# Patient Record
Sex: FEMALE | Race: WHITE | NOT HISPANIC OR LATINO | URBAN - METROPOLITAN AREA
[De-identification: names, ages, dates, MRNs, and addresses within clinical notes are randomized per-mention and may not be internally consistent; named-entity substitution may affect disease eponyms.]

---

## 2017-08-30 ENCOUNTER — OUTPATIENT (OUTPATIENT)
Dept: OUTPATIENT SERVICES | Facility: HOSPITAL | Age: 82
LOS: 1 days | Discharge: HOME | End: 2017-08-30

## 2017-08-30 DIAGNOSIS — M48.06 SPINAL STENOSIS, LUMBAR REGION: ICD-10-CM

## 2017-08-30 DIAGNOSIS — M48.061 SPINAL STENOSIS, LUMBAR REGION WITHOUT NEUROGENIC CLAUDICATION: ICD-10-CM

## 2017-08-30 DIAGNOSIS — Z01.818 ENCOUNTER FOR OTHER PREPROCEDURAL EXAMINATION: ICD-10-CM

## 2017-08-30 DIAGNOSIS — I10 ESSENTIAL (PRIMARY) HYPERTENSION: ICD-10-CM

## 2017-09-13 ENCOUNTER — INPATIENT (INPATIENT)
Facility: HOSPITAL | Age: 82
LOS: 0 days | Discharge: HOME | End: 2017-09-14
Attending: NEUROLOGICAL SURGERY

## 2017-09-13 DIAGNOSIS — I10 ESSENTIAL (PRIMARY) HYPERTENSION: ICD-10-CM

## 2017-09-13 DIAGNOSIS — Z87.891 PERSONAL HISTORY OF NICOTINE DEPENDENCE: ICD-10-CM

## 2017-09-13 DIAGNOSIS — M48.061 SPINAL STENOSIS, LUMBAR REGION WITHOUT NEUROGENIC CLAUDICATION: ICD-10-CM

## 2017-09-13 DIAGNOSIS — M19.90 UNSPECIFIED OSTEOARTHRITIS, UNSPECIFIED SITE: ICD-10-CM

## 2017-09-13 DIAGNOSIS — M48.06 SPINAL STENOSIS, LUMBAR REGION: ICD-10-CM

## 2017-09-13 DIAGNOSIS — M51.26 OTHER INTERVERTEBRAL DISC DISPLACEMENT, LUMBAR REGION: ICD-10-CM

## 2017-09-13 DIAGNOSIS — K21.9 GASTRO-ESOPHAGEAL REFLUX DISEASE WITHOUT ESOPHAGITIS: ICD-10-CM

## 2017-09-13 DIAGNOSIS — E66.9 OBESITY, UNSPECIFIED: ICD-10-CM

## 2018-01-25 ENCOUNTER — OUTPATIENT (OUTPATIENT)
Dept: OUTPATIENT SERVICES | Facility: HOSPITAL | Age: 83
LOS: 1 days | Discharge: HOME | End: 2018-01-25

## 2018-01-25 DIAGNOSIS — Z01.818 ENCOUNTER FOR OTHER PREPROCEDURAL EXAMINATION: ICD-10-CM

## 2018-01-25 DIAGNOSIS — M51.26 OTHER INTERVERTEBRAL DISC DISPLACEMENT, LUMBAR REGION: ICD-10-CM

## 2018-02-02 DIAGNOSIS — I10 ESSENTIAL (PRIMARY) HYPERTENSION: ICD-10-CM

## 2018-02-02 DIAGNOSIS — E66.9 OBESITY, UNSPECIFIED: ICD-10-CM

## 2018-02-04 DIAGNOSIS — M48.061 SPINAL STENOSIS, LUMBAR REGION WITHOUT NEUROGENIC CLAUDICATION: ICD-10-CM

## 2018-02-08 ENCOUNTER — INPATIENT (INPATIENT)
Facility: HOSPITAL | Age: 83
LOS: 1 days | Discharge: HOME | End: 2018-02-10
Attending: NEUROLOGICAL SURGERY

## 2018-02-08 VITALS
TEMPERATURE: 98 F | DIASTOLIC BLOOD PRESSURE: 91 MMHG | WEIGHT: 205.03 LBS | SYSTOLIC BLOOD PRESSURE: 137 MMHG | HEART RATE: 86 BPM | HEIGHT: 61 IN | RESPIRATION RATE: 18 BRPM

## 2018-02-08 RX ORDER — ASPIRIN/CALCIUM CARB/MAGNESIUM 324 MG
1 TABLET ORAL
Qty: 0 | Refills: 0 | COMMUNITY

## 2018-02-08 RX ORDER — ACETAMINOPHEN 500 MG
0 TABLET ORAL
Qty: 0 | Refills: 0 | COMMUNITY

## 2018-02-08 RX ORDER — OXYCODONE AND ACETAMINOPHEN 5; 325 MG/1; MG/1
1 TABLET ORAL EVERY 4 HOURS
Qty: 0 | Refills: 0 | Status: DISCONTINUED | OUTPATIENT
Start: 2018-02-08 | End: 2018-02-08

## 2018-02-08 RX ORDER — MEPERIDINE HYDROCHLORIDE 50 MG/ML
12.5 INJECTION INTRAMUSCULAR; INTRAVENOUS; SUBCUTANEOUS ONCE
Qty: 0 | Refills: 0 | Status: DISCONTINUED | OUTPATIENT
Start: 2018-02-08 | End: 2018-02-08

## 2018-02-08 RX ORDER — MORPHINE SULFATE 50 MG/1
4 CAPSULE, EXTENDED RELEASE ORAL
Qty: 0 | Refills: 0 | Status: DISCONTINUED | OUTPATIENT
Start: 2018-02-08 | End: 2018-02-08

## 2018-02-08 RX ORDER — ESOMEPRAZOLE MAGNESIUM 40 MG/1
1 CAPSULE, DELAYED RELEASE ORAL
Qty: 0 | Refills: 0 | COMMUNITY

## 2018-02-08 RX ORDER — OXYCODONE AND ACETAMINOPHEN 5; 325 MG/1; MG/1
2 TABLET ORAL ONCE
Qty: 0 | Refills: 0 | Status: DISCONTINUED | OUTPATIENT
Start: 2018-02-09 | End: 2018-02-10

## 2018-02-08 RX ORDER — SODIUM CHLORIDE 9 MG/ML
1000 INJECTION, SOLUTION INTRAVENOUS
Qty: 0 | Refills: 0 | Status: DISCONTINUED | OUTPATIENT
Start: 2018-02-08 | End: 2018-02-08

## 2018-02-08 RX ORDER — METHOCARBAMOL 500 MG/1
750 TABLET, FILM COATED ORAL ONCE
Qty: 0 | Refills: 0 | Status: COMPLETED | OUTPATIENT
Start: 2018-02-08 | End: 2018-02-09

## 2018-02-08 RX ORDER — MORPHINE SULFATE 50 MG/1
4 CAPSULE, EXTENDED RELEASE ORAL ONCE
Qty: 0 | Refills: 0 | Status: DISCONTINUED | OUTPATIENT
Start: 2018-02-09 | End: 2018-02-10

## 2018-02-08 RX ORDER — AMLODIPINE BESYLATE 2.5 MG/1
10 TABLET ORAL EVERY 24 HOURS
Qty: 0 | Refills: 0 | Status: DISCONTINUED | OUTPATIENT
Start: 2018-02-08 | End: 2018-02-10

## 2018-02-08 RX ORDER — OXYCODONE AND ACETAMINOPHEN 5; 325 MG/1; MG/1
2 TABLET ORAL ONCE
Qty: 0 | Refills: 0 | Status: DISCONTINUED | OUTPATIENT
Start: 2018-02-08 | End: 2018-02-08

## 2018-02-08 RX ORDER — ONDANSETRON 8 MG/1
4 TABLET, FILM COATED ORAL ONCE
Qty: 0 | Refills: 0 | Status: DISCONTINUED | OUTPATIENT
Start: 2018-02-09 | End: 2018-02-10

## 2018-02-08 RX ORDER — MORPHINE SULFATE 50 MG/1
2 CAPSULE, EXTENDED RELEASE ORAL
Qty: 0 | Refills: 0 | Status: DISCONTINUED | OUTPATIENT
Start: 2018-02-08 | End: 2018-02-08

## 2018-02-08 RX ORDER — METHOCARBAMOL 500 MG/1
750 TABLET, FILM COATED ORAL EVERY 8 HOURS
Qty: 0 | Refills: 0 | Status: DISCONTINUED | OUTPATIENT
Start: 2018-02-08 | End: 2018-02-08

## 2018-02-08 RX ORDER — ONDANSETRON 8 MG/1
4 TABLET, FILM COATED ORAL EVERY 6 HOURS
Qty: 0 | Refills: 0 | Status: DISCONTINUED | OUTPATIENT
Start: 2018-02-08 | End: 2018-02-08

## 2018-02-08 RX ORDER — SODIUM CHLORIDE 9 MG/ML
1000 INJECTION INTRAMUSCULAR; INTRAVENOUS; SUBCUTANEOUS
Qty: 0 | Refills: 0 | Status: DISCONTINUED | OUTPATIENT
Start: 2018-02-08 | End: 2018-02-09

## 2018-02-08 RX ORDER — DOCUSATE SODIUM 100 MG
100 CAPSULE ORAL ONCE
Qty: 0 | Refills: 0 | Status: COMPLETED | OUTPATIENT
Start: 2018-02-08 | End: 2018-02-09

## 2018-02-08 RX ORDER — FUROSEMIDE 40 MG
40 TABLET ORAL ONCE
Qty: 0 | Refills: 0 | Status: COMPLETED | OUTPATIENT
Start: 2018-02-08 | End: 2018-02-09

## 2018-02-08 RX ORDER — ONDANSETRON 8 MG/1
4 TABLET, FILM COATED ORAL ONCE
Qty: 0 | Refills: 0 | Status: DISCONTINUED | OUTPATIENT
Start: 2018-02-08 | End: 2018-02-08

## 2018-02-08 RX ADMIN — SODIUM CHLORIDE 75 MILLILITER(S): 9 INJECTION, SOLUTION INTRAVENOUS at 19:50

## 2018-02-08 NOTE — BRIEF OPERATIVE NOTE - POST-OP DX
Degenerative disc disease, lumbar  02/08/2018  L3-4  Active  Sunny Godoy  Lumbar herniated disc  02/08/2018  L3-4 recurrent  Active  Sunny Godoy

## 2018-02-08 NOTE — BRIEF OPERATIVE NOTE - PROCEDURE
<<-----Click on this checkbox to enter Procedure Laminectomy of lumbar spine with insertion of implant  02/08/2018  Coflex  Active  JSHIAU  Open discectomy of lumbar spine  02/08/2018  Reexploration for recurrent disc herniation with microscopic dissection  Active  JSHIAU

## 2018-02-08 NOTE — BRIEF OPERATIVE NOTE - PRE-OP DX
Lumbar degenerative disc disease  02/08/2018  L3-4  Active  Sunny Godoy  Lumbar disc herniation with radiculopathy  02/08/2018  Recurrent L3-4 herniated disc  Active  Sunny Godoy

## 2018-02-08 NOTE — PRE-ANESTHESIA EVALUATION ADULT - NSANTHPMHFT_GEN_ALL_CORE
hypertension, laminectomy in the past hypertension, Hiatal hernia, Osteoarthritis ,cholecystectomy, laminectomy in the past

## 2018-02-09 RX ORDER — OXYCODONE AND ACETAMINOPHEN 5; 325 MG/1; MG/1
1 TABLET ORAL EVERY 4 HOURS
Qty: 0 | Refills: 0 | Status: DISCONTINUED | OUTPATIENT
Start: 2018-02-09 | End: 2018-02-10

## 2018-02-09 RX ORDER — OXYCODONE AND ACETAMINOPHEN 5; 325 MG/1; MG/1
2 TABLET ORAL EVERY 4 HOURS
Qty: 0 | Refills: 0 | Status: DISCONTINUED | OUTPATIENT
Start: 2018-02-09 | End: 2018-02-10

## 2018-02-09 RX ORDER — ACETAMINOPHEN 500 MG
325 TABLET ORAL EVERY 4 HOURS
Qty: 0 | Refills: 0 | Status: DISCONTINUED | OUTPATIENT
Start: 2018-02-09 | End: 2018-02-10

## 2018-02-09 RX ORDER — DOCUSATE SODIUM 100 MG
100 CAPSULE ORAL THREE TIMES A DAY
Qty: 0 | Refills: 0 | Status: DISCONTINUED | OUTPATIENT
Start: 2018-02-09 | End: 2018-02-10

## 2018-02-09 RX ORDER — FUROSEMIDE 40 MG
40 TABLET ORAL DAILY
Qty: 0 | Refills: 0 | Status: DISCONTINUED | OUTPATIENT
Start: 2018-02-09 | End: 2018-02-10

## 2018-02-09 RX ORDER — METHOCARBAMOL 500 MG/1
750 TABLET, FILM COATED ORAL EVERY 6 HOURS
Qty: 0 | Refills: 0 | Status: DISCONTINUED | OUTPATIENT
Start: 2018-02-09 | End: 2018-02-10

## 2018-02-09 RX ORDER — HEPARIN SODIUM 5000 [USP'U]/ML
5000 INJECTION INTRAVENOUS; SUBCUTANEOUS EVERY 8 HOURS
Qty: 0 | Refills: 0 | Status: DISCONTINUED | OUTPATIENT
Start: 2018-02-09 | End: 2018-02-10

## 2018-02-09 RX ADMIN — AMLODIPINE BESYLATE 10 MILLIGRAM(S): 2.5 TABLET ORAL at 05:48

## 2018-02-09 RX ADMIN — Medication 40 MILLIGRAM(S): at 05:48

## 2018-02-09 RX ADMIN — Medication 100 MILLIGRAM(S): at 05:47

## 2018-02-09 RX ADMIN — HEPARIN SODIUM 5000 UNIT(S): 5000 INJECTION INTRAVENOUS; SUBCUTANEOUS at 15:09

## 2018-02-09 RX ADMIN — METHOCARBAMOL 750 MILLIGRAM(S): 500 TABLET, FILM COATED ORAL at 23:12

## 2018-02-09 RX ADMIN — Medication 40 MILLIGRAM(S): at 17:16

## 2018-02-09 RX ADMIN — OXYCODONE AND ACETAMINOPHEN 2 TABLET(S): 5; 325 TABLET ORAL at 20:44

## 2018-02-09 RX ADMIN — HEPARIN SODIUM 5000 UNIT(S): 5000 INJECTION INTRAVENOUS; SUBCUTANEOUS at 20:50

## 2018-02-09 RX ADMIN — OXYCODONE AND ACETAMINOPHEN 2 TABLET(S): 5; 325 TABLET ORAL at 10:56

## 2018-02-09 RX ADMIN — OXYCODONE AND ACETAMINOPHEN 2 TABLET(S): 5; 325 TABLET ORAL at 21:00

## 2018-02-09 RX ADMIN — Medication 100 MILLIGRAM(S): at 20:49

## 2018-02-09 RX ADMIN — Medication 100 MILLIGRAM(S): at 15:52

## 2018-02-09 RX ADMIN — Medication 100 MILLIGRAM(S): at 20:48

## 2018-02-09 RX ADMIN — Medication 325 MILLIGRAM(S): at 05:47

## 2018-02-09 RX ADMIN — Medication 100 MILLIGRAM(S): at 15:08

## 2018-02-09 RX ADMIN — Medication 100 MILLIGRAM(S): at 05:48

## 2018-02-09 RX ADMIN — METHOCARBAMOL 750 MILLIGRAM(S): 500 TABLET, FILM COATED ORAL at 17:14

## 2018-02-09 RX ADMIN — METHOCARBAMOL 750 MILLIGRAM(S): 500 TABLET, FILM COATED ORAL at 05:48

## 2018-02-09 NOTE — CONSULT NOTE ADULT - SUBJECTIVE AND OBJECTIVE BOX
HPI: 86 yo F admitted on2/8/18 for back pain radiating to right leg due to herniated disc at L3-L4. S/P reexploration and discectomy at L3-L4 and laminectomy.  Prior to hospitalization she was ambulating with walker.      PAST MEDICAL & SURGICAL HISTORY:  OBESITY OA HTN       Hospital Course:    TODAY'S SUBJECTIVE & REVIEW OF SYMPTOMS:     Constitutional WNL   Cardio WNL   Resp WNL   GI WNL  Heme WNL  Endo WNL  Skin WNL  MSK WNL  Neuro WNL  Cognitive WNL  Psych WNL      MEDICATIONS  (STANDING):  amLODIPine   Tablet 10 milliGRAM(s) Oral every 24 hours  clindamycin IVPB 600 milliGRAM(s) IV Intermittent every 8 hours  docusate sodium 100 milliGRAM(s) Oral three times a day  furosemide    Tablet 40 milliGRAM(s) Oral daily  heparin  Injectable 5000 Unit(s) SubCutaneous every 8 hours  methocarbamol 750 milliGRAM(s) Oral every 6 hours    MEDICATIONS  (PRN):  acetaminophen   Tablet 325 milliGRAM(s) Oral every 4 hours PRN pain  morphine  - Injectable 4 milliGRAM(s) IV Push Once PRN severe breakthrough pain  ondansetron Injectable 4 milliGRAM(s) IV Push Once PRN Nausea and/or Vomiting  oxyCODONE    5 mG/acetaminophen 325 mG 2 Tablet(s) Oral Once PRN Severe Pain (7 - 10)  oxyCODONE    5 mG/acetaminophen 325 mG 1 Tablet(s) Oral every 4 hours PRN Mild Pain (1 - 3)  oxyCODONE    5 mG/acetaminophen 325 mG 2 Tablet(s) Oral every 4 hours PRN Moderate Pain (4 - 6)      FAMILY HISTORY:NC      Allergies    penicillins (Unknown)  shellfish (Hives)    Intolerances        SOCIAL HISTORY:    [  ] Etoh  [  ] Smoking  [  ] Substance abuse     Home Environment:  [x  ] Home Alone  [  ] Lives with Family  [  ] Home Health Aid    Dwelling:  [  ] Apartment  [ x ] Private House  [  ] Adult Home  [  ] Skilled Nursing Facility      [  ] Short Term  [  ] Long Term  [ x ] Stairs       Elevator [  ]    FUNCTIONAL STATUS PTA: (Check all that apply)  Ambulation: [  x ]Independent    [  ] Dependent     [  ] Non-Ambulatory  Assistive Device: [  ] SA Cane  [  ]  Q Cane  [ x ] Walker  [  ]  Wheelchair  ADL : [x  ] Independent  [  ]  Dependent       Vital Signs Last 24 Hrs  T(C): 37 (09 Feb 2018 11:02), Max: 37.1 (08 Feb 2018 21:32)  T(F): 98.6 (09 Feb 2018 11:02), Max: 98.7 (08 Feb 2018 21:32)  HR: 96 (09 Feb 2018 11:02) (86 - 888)  BP: 152/67 (09 Feb 2018 11:02) (121/58 - 162/82)  BP(mean): --  RR: 18 (09 Feb 2018 11:02) (11 - 18)  SpO2: 97% (08 Feb 2018 22:00) (94% - 97%)      PHYSICAL EXAM: Alert & Oriented X3  GENERAL: NAD, well-groomed, well-developed  HEAD:  Atraumatic, Normocephalic  EYES: EOMI, PERRLA, conjunctiva and sclera clear  NECK: Supple, No JVD, Normal thyroid  CHEST/LUNG: Clear to percussion bilaterally; No rales, rhonchi, wheezing, or rubs  HEART: Regular rate and rhythm; No murmurs, rubs, or gallops  ABDOMEN: Soft, Nontender, Nondistended; Bowel sounds present  EXTREMITIES:  2+ Peripheral Pulses, No clubbing, cyanosis, or edema    NERVOUS SYSTEM:  Cranial Nerves 2-12 intact [x  ] Abnormal  [  ]  ROM: WFL all extremities [x  ]  Abnormal [  ]  Motor Strength: WFL all extremities  [x  ]  Abnormal [  ]  Sensation: intact to light touch [x  ] Abnormal [  ]  Reflexes: Symmetric [  ]  Abnormal [  ]    FUNCTIONAL STATUS:  Bed Mobility: Independent [  ]  Supervision [  ]  Needs Assistance [x  ]  N/A [  ]  Transfers: Independent [  ]  Supervision [  ]  Needs Assistance [x  ]  N/A [  ]   Ambulation: Independent [  ]  Supervision [  ]  Needs Assistance [  ]  N/A [  ]  ADL: Independent [  ] Requires Assistance [  ] N/A [  ]      LABS:                RADIOLOGY & ADDITIONAL STUDIES:    Assesment:

## 2018-02-09 NOTE — PROGRESS NOTE ADULT - SUBJECTIVE AND OBJECTIVE BOX
STATUS POST:  Re-exploration L 3-4 Laminectomy/Diskectomy with Coflex Stabilization    POST OPERATIVE DAY #: 0    SUBJECTIVE: Pt seen  SOB:  [ ] YES [x ] NO  Chest Discomfort: [ ] YES [x ] NO    Nausea: [ ] YES [ x] NO           Vomiting: [ ] YES [ x] NO  Flatus: [ ] YES [ x] NO             Bowel Movement: [ ] YES [x ] NO  Diarrhea: [ ] YES [x ] NO         Void: [x ]YES [ ]No  Constipation: [ ] YES [x ] NO     Pain (0-10):   4/10           Pain Control Adequate: [ x] YES [ ] NO  Watson:  NGT:    Vital Signs Last 24 Hrs  T(C): 36.5 (08 Feb 2018 23:39), Max: 37.1 (08 Feb 2018 21:32)  T(F): 97.7 (08 Feb 2018 23:39), Max: 98.7 (08 Feb 2018 21:32)  HR: 97 (08 Feb 2018 23:39) (86 - 888)  BP: 136/66 (08 Feb 2018 23:39) (124/86 - 162/82)  BP(mean): --  RR: 18 (08 Feb 2018 23:39) (11 - 18)  SpO2: 96% (08 Feb 2018 21:32) (94% - 97%)  I&O's Summary    08 Feb 2018 07:01  -  09 Feb 2018 00:05  --------------------------------------------------------  IN: 112 mL / OUT: 0 mL / NET: 112 mL      I&O's Detail    08 Feb 2018 07:01  -  09 Feb 2018 00:05  --------------------------------------------------------  IN:    lactated ringers.: 112 mL  Total IN: 112 mL    OUT:  Total OUT: 0 mL    Total NET: 112 mL          MEDICATIONS  (STANDING):  amLODIPine   Tablet 10 milliGRAM(s) Oral every 24 hours  clindamycin IVPB 600 milliGRAM(s) IV Intermittent Once  docusate sodium 100 milliGRAM(s) Oral Once  furosemide    Tablet 40 milliGRAM(s) Oral Once  methocarbamol 750 milliGRAM(s) Oral Once  sodium chloride 0.9%. 1000 milliLiter(s) (75 mL/Hr) IV Continuous <Continuous>    MEDICATIONS  (PRN):      LABS:                RADIOLOGY & ADDITIONAL STUDIES:    PHYSICAL EXAM:      Constitutional: Pt is in no distress, a&ox3, tolerating diet    Eyes:Perrrla    ENMT: wnl    Neck:wnl      Breasts:wnl    Back: dressing dry intact    Respiratory:b/l breath sounds    Cardiovascular: rate reg    Gastrointestinal: soft, non distended    Genitourinary:    Rectal:    Extremities: motor and sensory grossly intact in all fours    Vascular:    Neurological: CN ii-xii intact    Skin:    Lymph Nodes:    Musculoskeletal:    Psychiatric:        A/P: 85y Female M51.26 / 92793, 76504  ^M51.26 / 33900, 26050  MEWS Score  Degenerative disc disease, lumbar  Lumbar herniated disc  Lumbar degenerative disc disease  Lumbar disc herniation with radiculopathy  Open discectomy of lumbar spine  Laminectomy of lumbar spine with insertion of implant   s/p Re-exploration L3-4 Lami/diskectomy  - Diet: regular  - Activity: OOB in am  - Labs:   - Pain medication   - DVT ppx

## 2018-02-09 NOTE — CONSULT NOTE ADULT - ASSESSMENT
IMPRESSION: Rehab of lumbar radiculopathy    PRECAUTIONS: [  ] Cardiac  [  ] Respiratory  [  ] Seizures [  ] Contact Isolation  [  ] Droplet Isolation  [  ] Other    Weight Bearing Status:     RECOMMENDATION:    Out of Bed to Chair     DVT/Decubiti Prophylaxis    REHAB PLAN:     [ x  ] Bedside P/T 3-5 times a week   [   ]   Bedside O/T  2-3 times a week             [   ] No Rehab Therapy Indicated                   [   ]  Speech Therapy   Conditioning/ROM                                    ADL  Bed Mobility                                               Conditioning/ROM  Transfers                                                     Bed Mobility  Sitting /Standing Balance                         Transfers                                        Gait Training                                               Sitting/Standing Balance  Stair Training [   ]Applicable                    Home equipment Eval                                                                        Splinting  [   ] Only      GOALS:   ADL   [ x  ]   Independent                    Transfers  [x   ] Independent                          Ambulation  [ x  ] Independent     [ x   ] With device                            [   ]  CG                                                         [   ]  CG                                                                  [   ] CG                            [    ] Min A                                                   [   ] Min A                                                              [   ] Min  A          DISCHARGE PLAN:   [   ]  Good candidate for Intensive Rehabilitation/Hospital based-4A [facility]                                             Will tolerate 3hrs Intensive Rehab Daily                                       [    ]  Short Term Rehab in Skilled Nursing Facility                                       [  x  ]  Home with Outpatient or  services                                         [    ]  Possible Candidate for Intensive Hospital based Rehab

## 2018-02-09 NOTE — PROGRESS NOTE ADULT - SUBJECTIVE AND OBJECTIVE BOX
POD # 1    S/P Re-exploration of L3-4 laminectomy/diskectomy with coflex stabilization     Pt seen and examined in her room patient sitting at bedside patient not complaining of any pain.   patient has walked to the bathroom with mild assistance already.      Vital Signs Last 24 Hrs  T(C): 37 (09 Feb 2018 11:02), Max: 37.1 (08 Feb 2018 21:32)  T(F): 98.6 (09 Feb 2018 11:02), Max: 98.7 (08 Feb 2018 21:32)  HR: 96 (09 Feb 2018 11:02) (86 - 888)  BP: 152/67 (09 Feb 2018 11:02) (121/58 - 162/82)  BP(mean): --  RR: 18 (09 Feb 2018 11:02) (11 - 18)  SpO2: 97% (08 Feb 2018 22:00) (94% - 97%)    PHYSICAL EXAM:  A&OX3 PERRa   MAEX4   strength equal bilaterally  sensory intact   back incision site clean dry and intact           MEDICATIONS:  acetaminophen   Tablet 325 milliGRAM(s) Oral every 4 hours PRN  morphine  - Injectable 4 milliGRAM(s) IV Push Once PRN  ondansetron Injectable 4 milliGRAM(s) IV Push Once PRN  oxyCODONE    5 mG/acetaminophen 325 mG 2 Tablet(s) Oral Once PRN  oxyCODONE    5 mG/acetaminophen 325 mG 1 Tablet(s) Oral every 4 hours PRN  oxyCODONE    5 mG/acetaminophen 325 mG 2 Tablet(s) Oral every 4 hours PRN    Anticoagulation:        pt has on SCD's        will start DVT prophylaxis today [facility]    OTHER:  amLODIPine   Tablet 10 milliGRAM(s) Oral every 24 hours    IVF:  sodium chloride 0.9%. 1000 milliLiter(s) IV Continuous <Continuous>    will d/c    A/P     s/p Re- exploration of L3-4 laminectomy / diskectomy with coflex stabilization           PT/ rehab           Start DVT prophylaxis today           DC IVF

## 2018-02-10 ENCOUNTER — TRANSCRIPTION ENCOUNTER (OUTPATIENT)
Age: 83
End: 2018-02-10

## 2018-02-10 VITALS
HEART RATE: 75 BPM | SYSTOLIC BLOOD PRESSURE: 127 MMHG | DIASTOLIC BLOOD PRESSURE: 58 MMHG | TEMPERATURE: 98 F | RESPIRATION RATE: 18 BRPM

## 2018-02-10 RX ORDER — AMLODIPINE BESYLATE 2.5 MG/1
1 TABLET ORAL
Qty: 0 | Refills: 0 | COMMUNITY

## 2018-02-10 RX ORDER — ACETAMINOPHEN 500 MG
1 TABLET ORAL
Qty: 0 | Refills: 0 | DISCHARGE
Start: 2018-02-10

## 2018-02-10 RX ORDER — DOCUSATE SODIUM 100 MG
1 CAPSULE ORAL
Qty: 0 | Refills: 0 | DISCHARGE
Start: 2018-02-10

## 2018-02-10 RX ORDER — AMLODIPINE BESYLATE 2.5 MG/1
1 TABLET ORAL
Qty: 0 | Refills: 0 | DISCHARGE
Start: 2018-02-10

## 2018-02-10 RX ORDER — METHOCARBAMOL 500 MG/1
1 TABLET, FILM COATED ORAL
Qty: 90 | Refills: 0
Start: 2018-02-10

## 2018-02-10 RX ADMIN — METHOCARBAMOL 750 MILLIGRAM(S): 500 TABLET, FILM COATED ORAL at 17:37

## 2018-02-10 RX ADMIN — METHOCARBAMOL 750 MILLIGRAM(S): 500 TABLET, FILM COATED ORAL at 05:52

## 2018-02-10 RX ADMIN — AMLODIPINE BESYLATE 10 MILLIGRAM(S): 2.5 TABLET ORAL at 05:52

## 2018-02-10 RX ADMIN — Medication 100 MILLIGRAM(S): at 05:52

## 2018-02-10 RX ADMIN — METHOCARBAMOL 750 MILLIGRAM(S): 500 TABLET, FILM COATED ORAL at 12:31

## 2018-02-10 RX ADMIN — HEPARIN SODIUM 5000 UNIT(S): 5000 INJECTION INTRAVENOUS; SUBCUTANEOUS at 05:50

## 2018-02-10 RX ADMIN — OXYCODONE AND ACETAMINOPHEN 2 TABLET(S): 5; 325 TABLET ORAL at 06:01

## 2018-02-10 RX ADMIN — Medication 100 MILLIGRAM(S): at 05:53

## 2018-02-10 RX ADMIN — HEPARIN SODIUM 5000 UNIT(S): 5000 INJECTION INTRAVENOUS; SUBCUTANEOUS at 13:42

## 2018-02-10 RX ADMIN — Medication 100 MILLIGRAM(S): at 13:42

## 2018-02-10 RX ADMIN — OXYCODONE AND ACETAMINOPHEN 2 TABLET(S): 5; 325 TABLET ORAL at 06:47

## 2018-02-10 RX ADMIN — Medication 40 MILLIGRAM(S): at 05:52

## 2018-02-10 NOTE — DISCHARGE NOTE ADULT - NS AS ACTIVITY OBS
No Heavy lifting/straining/Showering allowed/Do not drive or operate machinery/Stairs allowed/Walking-Indoors allowed

## 2018-02-10 NOTE — DISCHARGE NOTE ADULT - CARE PROVIDER_API CALL
Sunny Godoy), Neurological Surgery  [address] [place] [address]  Phone: [phone]  Fax: [phone]

## 2018-02-10 NOTE — DISCHARGE NOTE ADULT - REASON FOR ADMISSION
s/p re-exploration of R lumbar discs 3,4, coflex with laminectomy s/p re-exploration of R lumbar discs 3,4, coflex with  laminectomy

## 2018-02-10 NOTE — DISCHARGE NOTE ADULT - ADDITIONAL INSTRUCTIONS
you may shower, no bending lifting or driving, remove dressing in 2 days, leave open to air, follow up in 2 weeks, call for appt [phone]

## 2018-02-10 NOTE — DISCHARGE NOTE ADULT - MEDICATION SUMMARY - MEDICATIONS TO TAKE
I will START or STAY ON the medications listed below when I get home from the hospital:    acetaminophen 325 mg oral tablet  -- 1 tab(s) by mouth every 4 hours, As needed, pain  -- Indication: For pain    amLODIPine 10 mg oral tablet  -- 1 tab(s) by mouth every 24 hours  -- Indication: For htn    furosemide 40 mg oral tablet  -- 1 tab(s) by mouth once a day  -- Indication: For htn    docusate sodium 100 mg oral capsule  -- 1 cap(s) by mouth 3 times a day  -- Indication: For constapation    methocarbamol 750 mg oral tablet  -- 1 tab(s) by mouth every 6 hours -for muscle spasm MDD:4   -- Indication: For Muscle relaxant

## 2018-02-10 NOTE — DISCHARGE NOTE ADULT - PATIENT PORTAL LINK FT
You can access the [facility] Portal, offered by [facility], by registering with the following website: [email or web address]

## 2018-02-10 NOTE — DISCHARGE NOTE ADULT - CARE PLAN
Principal Discharge DX:	Disc displacement, lumbar  Goal:	to ambulate  Assessment and plan of treatment:	stable, ambulate

## 2018-02-10 NOTE — DISCHARGE NOTE ADULT - HOSPITAL COURSE
s/p re-exploration L3-4, laminectomy and discectomy and stabilization on 2/8/18, patient did well and was cleared for dicharge by Dr Godoy, f/u in office

## 2018-02-10 NOTE — PROGRESS NOTE ADULT - SUBJECTIVE AND OBJECTIVE BOX
POD # 2    S/P Re-exploration of L3-4 laminectomy/diskectomy with coflex stabilization     Pt seen and examined in her room patient sitting at bedside patient not complaining of any pain.   patient has walked to the bathroom with mild assistance already.  No leg pain    PHYSICAL EXAM:  A&OX3   MAEX4   strength equal bilaterally  sensory intact   back incision site clean dry and intact     A/P     s/p Re- exploration of L3-4 laminectomy / diskectomy with coflex stabilization         Doing great.  D/C home today.  F/u 10-14 days

## 2018-02-12 DIAGNOSIS — M19.90 UNSPECIFIED OSTEOARTHRITIS, UNSPECIFIED SITE: ICD-10-CM

## 2018-02-12 DIAGNOSIS — M48.061 SPINAL STENOSIS, LUMBAR REGION WITHOUT NEUROGENIC CLAUDICATION: ICD-10-CM

## 2018-02-12 DIAGNOSIS — M51.16 INTERVERTEBRAL DISC DISORDERS WITH RADICULOPATHY, LUMBAR REGION: ICD-10-CM

## 2018-02-12 DIAGNOSIS — I10 ESSENTIAL (PRIMARY) HYPERTENSION: ICD-10-CM

## 2018-02-12 DIAGNOSIS — M51.26 OTHER INTERVERTEBRAL DISC DISPLACEMENT, LUMBAR REGION: ICD-10-CM

## 2018-02-12 LAB — SURGICAL PATHOLOGY STUDY: SIGNIFICANT CHANGE UP

## 2022-09-27 ENCOUNTER — INPATIENT (INPATIENT)
Facility: HOSPITAL | Age: 87
LOS: 1 days | Discharge: HOME | End: 2022-09-29
Attending: INTERNAL MEDICINE | Admitting: INTERNAL MEDICINE

## 2022-09-27 VITALS
HEIGHT: 72 IN | OXYGEN SATURATION: 97 % | RESPIRATION RATE: 20 BRPM | HEART RATE: 86 BPM | TEMPERATURE: 101 F | WEIGHT: 184.97 LBS | SYSTOLIC BLOOD PRESSURE: 141 MMHG | DIASTOLIC BLOOD PRESSURE: 71 MMHG

## 2022-09-27 DIAGNOSIS — Z90.49 ACQUIRED ABSENCE OF OTHER SPECIFIED PARTS OF DIGESTIVE TRACT: Chronic | ICD-10-CM

## 2022-09-27 DIAGNOSIS — N39.0 URINARY TRACT INFECTION, SITE NOT SPECIFIED: ICD-10-CM

## 2022-09-27 DIAGNOSIS — Z98.891 HISTORY OF UTERINE SCAR FROM PREVIOUS SURGERY: Chronic | ICD-10-CM

## 2022-09-27 DIAGNOSIS — I10 ESSENTIAL (PRIMARY) HYPERTENSION: ICD-10-CM

## 2022-09-27 DIAGNOSIS — Z98.890 OTHER SPECIFIED POSTPROCEDURAL STATES: Chronic | ICD-10-CM

## 2022-09-27 LAB
ALBUMIN SERPL ELPH-MCNC: 3.9 G/DL — SIGNIFICANT CHANGE UP (ref 3.5–5.2)
ALP SERPL-CCNC: 80 U/L — SIGNIFICANT CHANGE UP (ref 30–115)
ALT FLD-CCNC: 8 U/L — SIGNIFICANT CHANGE UP (ref 0–41)
ANION GAP SERPL CALC-SCNC: 8 MMOL/L — SIGNIFICANT CHANGE UP (ref 7–14)
APPEARANCE UR: CLEAR — SIGNIFICANT CHANGE UP
AST SERPL-CCNC: 16 U/L — SIGNIFICANT CHANGE UP (ref 0–41)
BACTERIA # UR AUTO: ABNORMAL
BASOPHILS # BLD AUTO: 0.04 K/UL — SIGNIFICANT CHANGE UP (ref 0–0.2)
BASOPHILS NFR BLD AUTO: 0.6 % — SIGNIFICANT CHANGE UP (ref 0–1)
BILIRUB SERPL-MCNC: 0.4 MG/DL — SIGNIFICANT CHANGE UP (ref 0.2–1.2)
BILIRUB UR-MCNC: NEGATIVE — SIGNIFICANT CHANGE UP
BUN SERPL-MCNC: 21 MG/DL — HIGH (ref 10–20)
CALCIUM SERPL-MCNC: 9.5 MG/DL — SIGNIFICANT CHANGE UP (ref 8.4–10.5)
CHLORIDE SERPL-SCNC: 104 MMOL/L — SIGNIFICANT CHANGE UP (ref 98–110)
CO2 SERPL-SCNC: 21 MMOL/L — SIGNIFICANT CHANGE UP (ref 17–32)
COD CRY URNS QL: NEGATIVE — SIGNIFICANT CHANGE UP
COLOR SPEC: YELLOW — SIGNIFICANT CHANGE UP
CREAT SERPL-MCNC: 1.3 MG/DL — SIGNIFICANT CHANGE UP (ref 0.7–1.5)
DIFF PNL FLD: ABNORMAL
EGFR: 39 ML/MIN/1.73M2 — LOW
EOSINOPHIL # BLD AUTO: 0.27 K/UL — SIGNIFICANT CHANGE UP (ref 0–0.7)
EOSINOPHIL NFR BLD AUTO: 4.1 % — SIGNIFICANT CHANGE UP (ref 0–8)
EPI CELLS # UR: ABNORMAL /HPF
GLUCOSE SERPL-MCNC: 109 MG/DL — HIGH (ref 70–99)
GLUCOSE UR QL: NEGATIVE MG/DL — SIGNIFICANT CHANGE UP
GRAN CASTS # UR COMP ASSIST: NEGATIVE — SIGNIFICANT CHANGE UP
HCT VFR BLD CALC: 38.3 % — SIGNIFICANT CHANGE UP (ref 37–47)
HGB BLD-MCNC: 12.6 G/DL — SIGNIFICANT CHANGE UP (ref 12–16)
HYALINE CASTS # UR AUTO: NEGATIVE — SIGNIFICANT CHANGE UP
IMM GRANULOCYTES NFR BLD AUTO: 0.2 % — SIGNIFICANT CHANGE UP (ref 0.1–0.3)
KETONES UR-MCNC: NEGATIVE — SIGNIFICANT CHANGE UP
LACTATE SERPL-SCNC: 1 MMOL/L — SIGNIFICANT CHANGE UP (ref 0.7–2)
LEUKOCYTE ESTERASE UR-ACNC: ABNORMAL
LYMPHOCYTES # BLD AUTO: 1.04 K/UL — LOW (ref 1.2–3.4)
LYMPHOCYTES # BLD AUTO: 15.7 % — LOW (ref 20.5–51.1)
MCHC RBC-ENTMCNC: 28.8 PG — SIGNIFICANT CHANGE UP (ref 27–31)
MCHC RBC-ENTMCNC: 32.9 G/DL — SIGNIFICANT CHANGE UP (ref 32–37)
MCV RBC AUTO: 87.6 FL — SIGNIFICANT CHANGE UP (ref 81–99)
MONOCYTES # BLD AUTO: 0.67 K/UL — HIGH (ref 0.1–0.6)
MONOCYTES NFR BLD AUTO: 10.1 % — HIGH (ref 1.7–9.3)
NEUTROPHILS # BLD AUTO: 4.61 K/UL — SIGNIFICANT CHANGE UP (ref 1.4–6.5)
NEUTROPHILS NFR BLD AUTO: 69.3 % — SIGNIFICANT CHANGE UP (ref 42.2–75.2)
NITRITE UR-MCNC: NEGATIVE — SIGNIFICANT CHANGE UP
NRBC # BLD: 0 /100 WBCS — SIGNIFICANT CHANGE UP (ref 0–0)
PH UR: 6.5 — SIGNIFICANT CHANGE UP (ref 5–8)
PLATELET # BLD AUTO: 186 K/UL — SIGNIFICANT CHANGE UP (ref 130–400)
POTASSIUM SERPL-MCNC: 4.9 MMOL/L — SIGNIFICANT CHANGE UP (ref 3.5–5)
POTASSIUM SERPL-SCNC: 4.9 MMOL/L — SIGNIFICANT CHANGE UP (ref 3.5–5)
PROT SERPL-MCNC: 7.1 G/DL — SIGNIFICANT CHANGE UP (ref 6–8)
PROT UR-MCNC: ABNORMAL MG/DL
RBC # BLD: 4.37 M/UL — SIGNIFICANT CHANGE UP (ref 4.2–5.4)
RBC # FLD: 14.3 % — SIGNIFICANT CHANGE UP (ref 11.5–14.5)
RBC CASTS # UR COMP ASSIST: >50 /HPF
SARS-COV-2 RNA SPEC QL NAA+PROBE: SIGNIFICANT CHANGE UP
SODIUM SERPL-SCNC: 133 MMOL/L — LOW (ref 135–146)
SP GR SPEC: 1.02 — SIGNIFICANT CHANGE UP (ref 1.01–1.03)
TRI-PHOS CRY UR QL COMP ASSIST: NEGATIVE — SIGNIFICANT CHANGE UP
URATE CRY FLD QL MICRO: NEGATIVE — SIGNIFICANT CHANGE UP
UROBILINOGEN FLD QL: 0.2 MG/DL — SIGNIFICANT CHANGE UP
WBC # BLD: 6.64 K/UL — SIGNIFICANT CHANGE UP (ref 4.8–10.8)
WBC # FLD AUTO: 6.64 K/UL — SIGNIFICANT CHANGE UP (ref 4.8–10.8)
WBC UR QL: ABNORMAL /HPF

## 2022-09-27 PROCEDURE — 99285 EMERGENCY DEPT VISIT HI MDM: CPT | Mod: GC

## 2022-09-27 RX ORDER — ACETAMINOPHEN 500 MG
650 TABLET ORAL EVERY 6 HOURS
Refills: 0 | Status: DISCONTINUED | OUTPATIENT
Start: 2022-09-27 | End: 2022-09-29

## 2022-09-27 RX ORDER — AMLODIPINE BESYLATE 2.5 MG/1
10 TABLET ORAL EVERY 24 HOURS
Refills: 0 | Status: DISCONTINUED | OUTPATIENT
Start: 2022-09-27 | End: 2022-09-29

## 2022-09-27 RX ORDER — FUROSEMIDE 40 MG
1 TABLET ORAL
Qty: 0 | Refills: 0 | DISCHARGE

## 2022-09-27 RX ORDER — ACETAMINOPHEN 500 MG
650 TABLET ORAL ONCE
Refills: 0 | Status: COMPLETED | OUTPATIENT
Start: 2022-09-27 | End: 2022-09-27

## 2022-09-27 RX ORDER — CEFTRIAXONE 500 MG/1
1000 INJECTION, POWDER, FOR SOLUTION INTRAMUSCULAR; INTRAVENOUS EVERY 24 HOURS
Refills: 0 | Status: DISCONTINUED | OUTPATIENT
Start: 2022-09-28 | End: 2022-09-29

## 2022-09-27 RX ORDER — CEFTRIAXONE 500 MG/1
1000 INJECTION, POWDER, FOR SOLUTION INTRAMUSCULAR; INTRAVENOUS ONCE
Refills: 0 | Status: COMPLETED | OUTPATIENT
Start: 2022-09-27 | End: 2022-09-27

## 2022-09-27 RX ADMIN — Medication 650 MILLIGRAM(S): at 17:46

## 2022-09-27 RX ADMIN — CEFTRIAXONE 100 MILLIGRAM(S): 500 INJECTION, POWDER, FOR SOLUTION INTRAMUSCULAR; INTRAVENOUS at 09:58

## 2022-09-27 RX ADMIN — Medication 650 MILLIGRAM(S): at 09:58

## 2022-09-27 NOTE — ED PROVIDER NOTE - NS ED ROS FT
Review of Systems:  CONSTITUTIONAL: No fever, No diaphoresis, No generalized weakness  SKIN: No rash  HEMATOLOGIC: No abnormal bleeding or bruising  EYES: No eye pain, No blurred vision  ENT: No change in hearing, No sore throat, No neck pain, No rhinorrhea, No ear pain  RESPIRATORY: No shortness of breath, No cough  CARDIAC: No chest pain, No palpitations  GI: No abdominal pain, No nausea, No vomiting, No diarrhea, No constipation, No bright red blood per rectum, No melena. No flank pain  : + dysuria, +frequency, No hematuria  MUSCULOSKELETAL: No joint paint, No swelling, No back pain  NEUROLOGIC: No numbness, No focal weakness, No headache, No dizziness  All other systems negative, unless specified in HPI

## 2022-09-27 NOTE — ED PROVIDER NOTE - PHYSICAL EXAMINATION
CONSTITUTIONAL: in no acute distress, afebrile  SKIN: Warm, dry  HEAD: Normocephalic; atraumatic  EYES: No conjunctival injection. EOMI  ENT: No nasal discharge; oropharynx nonerythematous; airway clear  NECK: Supple; non tender  CARD:  Regular rate and rhythm  RESP: CTAB  ABD: Soft NTND, no flank or cva tenderness; No guarding or rebound tenderness  EXT: Normal ROM.  No clubbing or cyanosis.  No edema. No calf tenderness  NEURO: A&O x3, grossly unremarkable, no focal deficits  *Chaperone RN was used during the encounter. A professional environment was maintained and discussed with patient CONSTITUTIONAL: in no acute distress, +febrile  SKIN: Warm, dry  HEAD: Normocephalic; atraumatic  EYES: No conjunctival injection. EOMI  ENT: No nasal discharge; oropharynx nonerythematous; airway clear  NECK: Supple; non tender  CARD:  Regular rate and rhythm  RESP: CTAB  ABD: Soft NTND, no flank or cva tenderness; No guarding or rebound tenderness  EXT: Normal ROM.  No clubbing or cyanosis.  No edema. No calf tenderness  NEURO: A&O x3, grossly unremarkable, no focal deficits  *Chaperone RN was used during the encounter. A professional environment was maintained and discussed with patient

## 2022-09-27 NOTE — H&P ADULT - NSHPREVIEWOFSYSTEMS_GEN_ALL_CORE
REVIEW OF SYSTEMS:    CONSTITUTIONAL: see HPI  EYES/ENT: No visual changes;  No vertigo or throat pain   NECK: No pain or stiffness  RESPIRATORY: No cough, wheezing, hemoptysis; No shortness of breath  CARDIOVASCULAR: No chest pain or palpitations  GASTROINTESTINAL: see HPI  GENITOURINARY: No dysuria, frequency or hematuria  NEUROLOGICAL: No numbness or weakness  SKIN: No itching, rashes

## 2022-09-27 NOTE — H&P ADULT - HISTORY OF PRESENT ILLNESS
89-year-old female with PMH of HTN who presents with urinary symptoms x2 weeks.  Patient was on Cipro x10 days no relief in symptoms then started amoxicillin for the past 3 days without any relief in symptoms.  Had a fever this morning TMAX 100.7 & came to ER.  Patient denies any other complaints.  Patient denies any abdominal pain, hematuria, nausea, vomiting, constipation, diarrhea, chest pain, shortness of breath, rash, recent travel, cough, sore throat.

## 2022-09-27 NOTE — ED PROVIDER NOTE - WET READ LAUNCH FT
There are no Wet Read(s) to document.

## 2022-09-27 NOTE — ED PROVIDER NOTE - CLINICAL SUMMARY MEDICAL DECISION MAKING FREE TEXT BOX
-patient completed multiple courses of p.o. antibiotics failure of outpatient therapy still continues to be symptomatic at this time Dr. Polk.  Sent patient in for admission IV antibiotics on exam no guarding or rebound abdominal for further management

## 2022-09-27 NOTE — H&P ADULT - ASSESSMENT
This is an 89 yr old female w/ PMHx of HTN, admitted for failed outpatient treatment of UTI.  Admitted for Abx resistant UTI.

## 2022-09-27 NOTE — H&P ADULT - NSHPLABSRESULTS_GEN_ALL_CORE
12.6   6.64  )-----------( 186      ( 27 Sep 2022 10:20 )             38.3           133<L>  |  104  |  21<H>  ----------------------------<  109<H>  4.9   |  21  |  1.3    Ca    9.5      27 Sep 2022 10:20    TPro  7.1  /  Alb  3.9  /  TBili  0.4  /  DBili  x   /  AST  16  /  ALT  8   /  AlkPhos  80                Urinalysis Basic - ( 27 Sep 2022 10:30 )    Color: Yellow / Appearance: Clear / S.020 / pH: x  Gluc: x / Ketone: Negative  / Bili: Negative / Urobili: 0.2 mg/dL   Blood: x / Protein: Trace mg/dL / Nitrite: Negative   Leuk Esterase: Small / RBC: >50 /HPF / WBC 26-50 /HPF   Sq Epi: x / Non Sq Epi: Few /HPF / Bacteria: Few    Lactate Trend   @ 10:20 Lactate:1.0

## 2022-09-27 NOTE — H&P ADULT - NSICDXPASTSURGICALHX_GEN_ALL_CORE_FT
PAST SURGICAL HISTORY:  H/O:      History of back surgery     S/P cholecystectomy      teach back/(1) partially meets; needs review/practice/verbalization

## 2022-09-27 NOTE — H&P ADULT - NSHPPHYSICALEXAM_GEN_ALL_CORE
GENERAL:  89y/o Female NAD, resting comfortably.  HEAD:  Atraumatic, Normocephalic  EYES: EOMI, PERRLA, conjunctiva and sclera clear  NECK: Supple, No JVD, no cervical lymphadenopathy, non-tender  CHEST/LUNG: Clear to auscultation bilaterally; No wheeze, rhonchi, or rales  HEART: Regular rate and rhythm; S1&S2  ABDOMEN: Soft, Nontender, Nondistended x 4 quadrants; Bowel sounds present  EXTREMITIES:   Peripheral Pulses Present, No clubbing, no cyanosis, or no edema, no calf tenderness  PSYCH: AAOx3, cooperative, appropriate  NEUROLOGY: WNL  SKIN: WNL

## 2022-09-27 NOTE — H&P ADULT - PROBLEM SELECTOR PLAN 1
- s/p Ceftriaxone 1gram in ED  - Continue Ceftriaxone   - ID Consult  - Monitor Temp/ WBC  - Follow Urine Cx  - UA noted in ED

## 2022-09-27 NOTE — H&P ADULT - NSHPSOCIALHISTORY_GEN_ALL_CORE
Living Condition: lives at home  Ambulation Status:   with walker  Tobacco use:  Denies  EtOH use:  Denies  Illicit drug use: Denies  Marital Status: Unk

## 2022-09-27 NOTE — ED PROVIDER NOTE - OBJECTIVE STATEMENT
89-year-old female with PMH of HTN who presents with urinary symptoms x2 weeks.  Patient was on Cipro x10 days no relief in symptoms then started amoxicillin for the past 3 days without any relief in symptoms.  Had a fever this morning so came to ER.  Patient denies any other complaints.  Patient denies any abdominal pain, hematuria, nausea, vomiting, constipation, diarrhea, chest pain, shortness of breath, rash, recent travel, cough, sore throat.

## 2022-09-28 LAB
ALBUMIN SERPL ELPH-MCNC: 4.1 G/DL — SIGNIFICANT CHANGE UP (ref 3.5–5.2)
ALP SERPL-CCNC: 80 U/L — SIGNIFICANT CHANGE UP (ref 30–115)
ALT FLD-CCNC: 8 U/L — SIGNIFICANT CHANGE UP (ref 0–41)
ANION GAP SERPL CALC-SCNC: 13 MMOL/L — SIGNIFICANT CHANGE UP (ref 7–14)
AST SERPL-CCNC: 13 U/L — SIGNIFICANT CHANGE UP (ref 0–41)
BASOPHILS # BLD AUTO: 0.02 K/UL — SIGNIFICANT CHANGE UP (ref 0–0.2)
BASOPHILS NFR BLD AUTO: 0.3 % — SIGNIFICANT CHANGE UP (ref 0–1)
BILIRUB SERPL-MCNC: 0.3 MG/DL — SIGNIFICANT CHANGE UP (ref 0.2–1.2)
BUN SERPL-MCNC: 16 MG/DL — SIGNIFICANT CHANGE UP (ref 10–20)
C DIFF BY PCR RESULT: NEGATIVE — SIGNIFICANT CHANGE UP
C DIFF TOX GENS STL QL NAA+PROBE: SIGNIFICANT CHANGE UP
CALCIUM SERPL-MCNC: 9.8 MG/DL — SIGNIFICANT CHANGE UP (ref 8.4–10.5)
CHLORIDE SERPL-SCNC: 104 MMOL/L — SIGNIFICANT CHANGE UP (ref 98–110)
CO2 SERPL-SCNC: 20 MMOL/L — SIGNIFICANT CHANGE UP (ref 17–32)
CREAT SERPL-MCNC: 1.1 MG/DL — SIGNIFICANT CHANGE UP (ref 0.7–1.5)
CULTURE RESULTS: NO GROWTH — SIGNIFICANT CHANGE UP
EGFR: 48 ML/MIN/1.73M2 — LOW
EOSINOPHIL # BLD AUTO: 0.06 K/UL — SIGNIFICANT CHANGE UP (ref 0–0.7)
EOSINOPHIL NFR BLD AUTO: 1 % — SIGNIFICANT CHANGE UP (ref 0–8)
GLUCOSE SERPL-MCNC: 115 MG/DL — HIGH (ref 70–99)
HCT VFR BLD CALC: 38.9 % — SIGNIFICANT CHANGE UP (ref 37–47)
HGB BLD-MCNC: 12.7 G/DL — SIGNIFICANT CHANGE UP (ref 12–16)
IMM GRANULOCYTES NFR BLD AUTO: 0.2 % — SIGNIFICANT CHANGE UP (ref 0.1–0.3)
LYMPHOCYTES # BLD AUTO: 1.28 K/UL — SIGNIFICANT CHANGE UP (ref 1.2–3.4)
LYMPHOCYTES # BLD AUTO: 21.2 % — SIGNIFICANT CHANGE UP (ref 20.5–51.1)
MCHC RBC-ENTMCNC: 28.5 PG — SIGNIFICANT CHANGE UP (ref 27–31)
MCHC RBC-ENTMCNC: 32.6 G/DL — SIGNIFICANT CHANGE UP (ref 32–37)
MCV RBC AUTO: 87.2 FL — SIGNIFICANT CHANGE UP (ref 81–99)
MONOCYTES # BLD AUTO: 0.4 K/UL — SIGNIFICANT CHANGE UP (ref 0.1–0.6)
MONOCYTES NFR BLD AUTO: 6.6 % — SIGNIFICANT CHANGE UP (ref 1.7–9.3)
NEUTROPHILS # BLD AUTO: 4.27 K/UL — SIGNIFICANT CHANGE UP (ref 1.4–6.5)
NEUTROPHILS NFR BLD AUTO: 70.7 % — SIGNIFICANT CHANGE UP (ref 42.2–75.2)
NRBC # BLD: 0 /100 WBCS — SIGNIFICANT CHANGE UP (ref 0–0)
PLATELET # BLD AUTO: 208 K/UL — SIGNIFICANT CHANGE UP (ref 130–400)
POTASSIUM SERPL-MCNC: 4.4 MMOL/L — SIGNIFICANT CHANGE UP (ref 3.5–5)
POTASSIUM SERPL-SCNC: 4.4 MMOL/L — SIGNIFICANT CHANGE UP (ref 3.5–5)
PROT SERPL-MCNC: 7 G/DL — SIGNIFICANT CHANGE UP (ref 6–8)
RBC # BLD: 4.46 M/UL — SIGNIFICANT CHANGE UP (ref 4.2–5.4)
RBC # FLD: 14.1 % — SIGNIFICANT CHANGE UP (ref 11.5–14.5)
SODIUM SERPL-SCNC: 137 MMOL/L — SIGNIFICANT CHANGE UP (ref 135–146)
SPECIMEN SOURCE: SIGNIFICANT CHANGE UP
WBC # BLD: 6.04 K/UL — SIGNIFICANT CHANGE UP (ref 4.8–10.8)
WBC # FLD AUTO: 6.04 K/UL — SIGNIFICANT CHANGE UP (ref 4.8–10.8)

## 2022-09-28 RX ORDER — LANOLIN ALCOHOL/MO/W.PET/CERES
3 CREAM (GRAM) TOPICAL ONCE
Refills: 0 | Status: COMPLETED | OUTPATIENT
Start: 2022-09-28 | End: 2022-09-28

## 2022-09-28 RX ORDER — ONDANSETRON 8 MG/1
4 TABLET, FILM COATED ORAL EVERY 8 HOURS
Refills: 0 | Status: DISCONTINUED | OUTPATIENT
Start: 2022-09-28 | End: 2022-09-29

## 2022-09-28 RX ADMIN — CEFTRIAXONE 100 MILLIGRAM(S): 500 INJECTION, POWDER, FOR SOLUTION INTRAMUSCULAR; INTRAVENOUS at 14:50

## 2022-09-28 RX ADMIN — Medication 650 MILLIGRAM(S): at 16:54

## 2022-09-28 RX ADMIN — Medication 650 MILLIGRAM(S): at 07:56

## 2022-09-28 RX ADMIN — Medication 650 MILLIGRAM(S): at 09:00

## 2022-09-28 RX ADMIN — Medication 650 MILLIGRAM(S): at 01:27

## 2022-09-28 RX ADMIN — Medication 650 MILLIGRAM(S): at 14:50

## 2022-09-28 RX ADMIN — Medication 650 MILLIGRAM(S): at 21:09

## 2022-09-28 RX ADMIN — Medication 3 MILLIGRAM(S): at 22:32

## 2022-09-28 RX ADMIN — Medication 650 MILLIGRAM(S): at 21:49

## 2022-09-28 RX ADMIN — ONDANSETRON 4 MILLIGRAM(S): 8 TABLET, FILM COATED ORAL at 01:24

## 2022-09-28 RX ADMIN — AMLODIPINE BESYLATE 10 MILLIGRAM(S): 2.5 TABLET ORAL at 06:14

## 2022-09-28 NOTE — CONSULT NOTE ADULT - SUBJECTIVE AND OBJECTIVE BOX
Patient is a 89y old  Female who presents with a chief complaint of Failed outpatient UTI treatment (28 Sep 2022 07:41)      INTERVAL HPI/OVERNIGHT EVENTS:        PAST MEDICAL & SURGICAL HISTORY:  HTN (hypertension)      UTI (urinary tract infection)      S/P cholecystectomy      H/O:       History of back surgery          REVIEW OF SYSTEMS: Total of twelve systems have been reviewed with patient and found to be negative unless mentioned in HPI      SOCIAL HISTORY  Alcohol: Does not drink  Tobacco: Does not smoke  Illicit substance use: None      FAMILY HISTORY: Non contributory to the present illness        ALLERGIES: penicillins (Unknown)  shellfish (Hives)      Vital Signs Last 24 Hrs  T(C): 36.7 (28 Sep 2022 13:49), Max: 37.2 (28 Sep 2022 05:23)  T(F): 98 (28 Sep 2022 13:49), Max: 99 (28 Sep 2022 05:23)  HR: 77 (28 Sep 2022 13:49) (77 - 95)  BP: 110/57 (28 Sep 2022 13:49) (108/52 - 134/60)  BP(mean): --  RR: 18 (28 Sep 2022 13:49) (16 - 18)  SpO2: --    Parameters below as of 27 Sep 2022 20:00  Patient On (Oxygen Delivery Method): room air        PHYSICAL EXAM:  GENERAL: Not in distress   CHEST/LUNG:  Aire ntry bilaterally  HEART: s1 and s2 present  ABDOMEN:  Nontender and  Nondistended  EXTREMITIES: No pedal  edema  CNS: Awake and Alert      LABS:                        12.7   6.04  )-----------( 208      ( 28 Sep 2022 05:37 )             38.9           137  |  104  |  16  ----------------------------<  115<H>  4.4   |  20  |  1.1    Ca    9.8      28 Sep 2022 05:37    TPro  7.0  /  Alb  4.1  /  TBili  0.3  /  DBili  x   /  AST  13  /  ALT  8   /  AlkPhos  80  09-2          Urinalysis Basic - ( 27 Sep 2022 10:30 )  Color: Yellow / Appearance: Clear / S.020 / pH: x  Gluc: x / Ketone: Negative  / Bili: Negative / Urobili: 0.2 mg/dL   Blood: x / Protein: Trace mg/dL / Nitrite: Negative   Leuk Esterase: Small / RBC: >50 /HPF / WBC 26-50 /HPF   Sq Epi: x / Non Sq Epi: Few /HPF / Bacteria: Few        MEDICATIONS  (STANDING):  amLODIPine   Tablet 10 milliGRAM(s) Oral every 24 hours  cefTRIAXone   IVPB 1000 milliGRAM(s) IV Intermittent every 24 hours    MEDICATIONS  (PRN):  acetaminophen     Tablet .. 650 milliGRAM(s) Oral every 6 hours PRN Temp greater or equal to 38C (100.4F), Mild Pain (1 - 3)  ondansetron Injectable 4 milliGRAM(s) IV Push every 8 hours PRN Nausea and/or Vomiting          RADIOLOGY & ADDITIONAL TESTS:               Patient is a 89y old  Female with PMH of HTN who presents to the ER for evaluation of urinary frequency  x2 weeks.  Patient was on Cipro x10 days no relief in symptoms then started amoxicillin for the past 3 days without any relief in symptoms.  Had a fever this morning TMAX 100.7 & came to ER. On admission, she found to have fever, and positive urine analysis. She has started on Ceftriaxone and the ID consult requested to assist with further evaluation and antibiotic management.      REVIEW OF SYSTEMS: Total of twelve systems have been reviewed with patient and found to be negative unless mentioned in HPI      PAST MEDICAL & SURGICAL HISTORY:  HTN (hypertension)  UTI (urinary tract infection)  S/P cholecystectomy  H/O:   History of back surgery      SOCIAL HISTORY  Alcohol: Does not drink  Tobacco: Does not smoke  Illicit substance use: None      FAMILY HISTORY: Non contributory to the present illness        ALLERGIES: penicillins (Unknown)  shellfish (Hives)      Vital Signs Last 24 Hrs  T(C): 36.7 (28 Sep 2022 13:49), Max: 37.2 (28 Sep 2022 05:23)  T(F): 98 (28 Sep 2022 13:49), Max: 99 (28 Sep 2022 05:23)  HR: 77 (28 Sep 2022 13:49) (77 - 95)  BP: 110/57 (28 Sep 2022 13:49) (108/52 - 134/60)  BP(mean): --  RR: 18 (28 Sep 2022 13:49) (16 - 18)  SpO2: --    Parameters below as of 27 Sep 2022 20:00  Patient On (Oxygen Delivery Method): room air        PHYSICAL EXAM:  GENERAL: Not in distress   CHEST/LUNG: Not using accessory muscles  HEART: s1 and s2 present  ABDOMEN:  Nontender and  Nondistended  EXTREMITIES: No pedal  edema  CNS: Awake and Alert      LABS:                        12.7   6.04  )-----------( 208      ( 28 Sep 2022 05:37 )             38.9       -    137  |  104  |  16  ----------------------------<  115<H>  4.4   |  20  |  1.1    Ca    9.8      28 Sep 2022 05:37    TPro  7.0  /  Alb  4.1  /  TBili  0.3  /  DBili  x   /  AST  13  /  ALT  8   /  AlkPhos  80  09-2        Urinalysis Basic - ( 27 Sep 2022 10:30 )  Color: Yellow / Appearance: Clear / S.020 / pH: x  Gluc: x / Ketone: Negative  / Bili: Negative / Urobili: 0.2 mg/dL   Blood: x / Protein: Trace mg/dL / Nitrite: Negative   Leuk Esterase: Small / RBC: >50 /HPF / WBC 26-50 /HPF   Sq Epi: x / Non Sq Epi: Few /HPF / Bacteria: Few        MEDICATIONS  (STANDING):  amLODIPine   Tablet 10 milliGRAM(s) Oral every 24 hours  cefTRIAXone   IVPB 1000 milliGRAM(s) IV Intermittent every 24 hours    MEDICATIONS  (PRN):  acetaminophen     Tablet .. 650 milliGRAM(s) Oral every 6 hours PRN Temp greater or equal to 38C (100.4F), Mild Pain (1 - 3)  ondansetron Injectable 4 milliGRAM(s) IV Push every 8 hours PRN Nausea and/or Vomiting        RADIOLOGY & ADDITIONAL TESTS:    Clostridium difficile Toxin by PCR (22 @ 15:04)   C Diff by PCR Result: Negative   Clostridium difficile Toxin by PCR: RESULT INTERPRETATION:   Not Detected -    Culture - Urine (22 @ 10:30)   Specimen Source: Clean Catch Clean Catch (Midstream)   Culture Results: No growth     COVID-19 PCR (22 @ 09:10)   COVID-19 PCR: NotDetec: Y

## 2022-09-28 NOTE — CONSULT NOTE ADULT - ASSESSMENT
Patient is a 89y old  Female with PMH of HTN who presents to the ER for evaluation of urinary frequency  x2 weeks.  Patient was on Cipro x10 days no relief in symptoms then started amoxicillin for the past 3 days without any relief in symptoms.  Had a fever this morning TMAX 100.7 & came to ER. On admission, she found to have fever, and positive urine analysis. She has started on Ceftriaxone and the ID consult requested to assist with further evaluation and antibiotic management.    # UTI - WBC 26-50 /HPF - Urine CX from 9/27 have no growth , most likely result is biased due to prior Abx on board     would recommend:    1. Continue Ceftriaxone inpatient   2. May change to oral Abx on discharge to continue until 10/1/22  3. OOB to chair    will follow the patient with you and make further recommendation based on the clinical course and Lab results  Thank you for the opportunity to participate in Ms. BULLARD's care      Attending Attestation:    Spent more than 65 minutes on total encounter, more than 50 % of the visit was spent counseling and/or coordinating care by the Attending physician.

## 2022-09-29 ENCOUNTER — TRANSCRIPTION ENCOUNTER (OUTPATIENT)
Age: 87
End: 2022-09-29

## 2022-09-29 VITALS
HEART RATE: 92 BPM | TEMPERATURE: 97 F | DIASTOLIC BLOOD PRESSURE: 58 MMHG | RESPIRATION RATE: 16 BRPM | SYSTOLIC BLOOD PRESSURE: 124 MMHG

## 2022-09-29 LAB
ANION GAP SERPL CALC-SCNC: 11 MMOL/L — SIGNIFICANT CHANGE UP (ref 7–14)
BUN SERPL-MCNC: 20 MG/DL — SIGNIFICANT CHANGE UP (ref 10–20)
CALCIUM SERPL-MCNC: 9.9 MG/DL — SIGNIFICANT CHANGE UP (ref 8.4–10.5)
CHLORIDE SERPL-SCNC: 103 MMOL/L — SIGNIFICANT CHANGE UP (ref 98–110)
CO2 SERPL-SCNC: 23 MMOL/L — SIGNIFICANT CHANGE UP (ref 17–32)
CREAT SERPL-MCNC: 1.1 MG/DL — SIGNIFICANT CHANGE UP (ref 0.7–1.5)
EGFR: 48 ML/MIN/1.73M2 — LOW
GLUCOSE SERPL-MCNC: 84 MG/DL — SIGNIFICANT CHANGE UP (ref 70–99)
HCT VFR BLD CALC: 38.5 % — SIGNIFICANT CHANGE UP (ref 37–47)
HGB BLD-MCNC: 12.4 G/DL — SIGNIFICANT CHANGE UP (ref 12–16)
MCHC RBC-ENTMCNC: 28.4 PG — SIGNIFICANT CHANGE UP (ref 27–31)
MCHC RBC-ENTMCNC: 32.2 G/DL — SIGNIFICANT CHANGE UP (ref 32–37)
MCV RBC AUTO: 88.1 FL — SIGNIFICANT CHANGE UP (ref 81–99)
NRBC # BLD: 0 /100 WBCS — SIGNIFICANT CHANGE UP (ref 0–0)
PLATELET # BLD AUTO: 206 K/UL — SIGNIFICANT CHANGE UP (ref 130–400)
POTASSIUM SERPL-MCNC: 4.2 MMOL/L — SIGNIFICANT CHANGE UP (ref 3.5–5)
POTASSIUM SERPL-SCNC: 4.2 MMOL/L — SIGNIFICANT CHANGE UP (ref 3.5–5)
RBC # BLD: 4.37 M/UL — SIGNIFICANT CHANGE UP (ref 4.2–5.4)
RBC # FLD: 14.1 % — SIGNIFICANT CHANGE UP (ref 11.5–14.5)
SODIUM SERPL-SCNC: 137 MMOL/L — SIGNIFICANT CHANGE UP (ref 135–146)
WBC # BLD: 6.33 K/UL — SIGNIFICANT CHANGE UP (ref 4.8–10.8)
WBC # FLD AUTO: 6.33 K/UL — SIGNIFICANT CHANGE UP (ref 4.8–10.8)

## 2022-09-29 RX ORDER — CEFUROXIME AXETIL 250 MG
1 TABLET ORAL
Qty: 8 | Refills: 0
Start: 2022-09-29 | End: 2022-10-02

## 2022-09-29 RX ADMIN — Medication 650 MILLIGRAM(S): at 08:17

## 2022-09-29 RX ADMIN — AMLODIPINE BESYLATE 10 MILLIGRAM(S): 2.5 TABLET ORAL at 05:31

## 2022-09-29 RX ADMIN — Medication 650 MILLIGRAM(S): at 09:38

## 2022-09-29 NOTE — DISCHARGE NOTE PROVIDER - NSDCCPCAREPLAN_GEN_ALL_CORE_FT
PRINCIPAL DISCHARGE DIAGNOSIS  Diagnosis: UTI (urinary tract infection)  Assessment and Plan of Treatment:

## 2022-09-29 NOTE — DISCHARGE NOTE NURSING/CASE MANAGEMENT/SOCIAL WORK - PATIENT PORTAL LINK FT
You can access the FollowMyHealth Patient Portal offered by [facility] by registering at the following website: [email or web address]. By joining [employer]’s FollowMyHealth portal, you will also be able to view your health information using other applications (apps) compatible with our system.

## 2022-09-29 NOTE — DISCHARGE NOTE PROVIDER - NSDCMRMEDTOKEN_GEN_ALL_CORE_FT
amLODIPine 10 mg oral tablet: 1 tab(s) orally every 24 hours  cefuroxime 250 mg oral tablet: 1 tab(s) orally 2 times a day   furosemide 40 mg oral tablet: 1 tab(s) orally once a day

## 2022-09-29 NOTE — DISCHARGE NOTE PROVIDER - CARE PROVIDER_API CALL
Shorty Polk)  Internal Medicine  [address] [place] [address], [address]  [place] [address]  Phone: [phone]  Fax: [phone]  Established Patient  Follow Up Time: 1-3 days

## 2022-09-29 NOTE — PROGRESS NOTE ADULT - ASSESSMENT
88 y/o female presents with fever and failed treatment of outpatient UTI       # UTI failed out patient treatment     - ceftriaxone   - follow up cultures   - switch to orals  - foi D/C     # HTN     - continue medications       ADVANCe CARE : DISCUSSED CPR     Disposition: home today     
89 y/o female presents with fever and failed treatment of outpatient UTI       # UTI failed out patient treatment     - ceftriaxone   - follow up cultures   - switch to orals     # HTN     - continue medications       ADVANCe CARE : DISCUSSED CPR     Disposition: home today

## 2022-09-29 NOTE — PROGRESS NOTE ADULT - SUBJECTIVE AND OBJECTIVE BOX
89-year-old female with PMH of HTN who presents with urinary symptoms x2 weeks.  Patient was on Cipro x10 days no relief in symptoms then started amoxicillin for the past 3 days without any relief in symptoms.  Had a fever this morning TMAX 100.7 & came to ER.  Patient denies any other complaints.  Patient denies any abdominal pain, hematuria, nausea, vomiting, constipation, diarrhea, chest pain, shortness of breath, rash, recent travel, cough, sore throat    INTERVAL: seen in room this am feeling better     PAST MEDICAL & SURGICAL HISTORY:    HTN (hypertension)  UTI (urinary tract infection)  S/P cholecystectomy  H/O:   History of back surgery    ROS:     No fever no chills no sob     MEDICATIONS  (STANDING):  amLODIPine   Tablet 10 milliGRAM(s) Oral every 24 hours  cefTRIAXone   IVPB 1000 milliGRAM(s) IV Intermittent every 24 hours    MEDICATIONS  (PRN):  acetaminophen     Tablet .. 650 milliGRAM(s) Oral every 6 hours PRN Temp greater or equal to 38C (100.4F), Mild Pain (1 - 3)  ondansetron Injectable 4 milliGRAM(s) IV Push every 8 hours PRN Nausea and/or Vomiting    Vital Signs Last 24 Hrs  T(C): 37.2 (28 Sep 2022 05:23), Max: 38.2 (27 Sep 2022 08:53)  T(F): 99 (28 Sep 2022 05:23), Max: 100.7 (27 Sep 2022 08:53)  HR: 82 (28 Sep 2022 05:23) (69 - 95)  BP: 134/60 (28 Sep 2022 05:23) (108/52 - 141/71)  BP(mean): --  RR: 16 (28 Sep 2022 05:23) (16 - 20)  SpO2: 97% (27 Sep 2022 12:13) (97% - 97%)    Parameters below as of 27 Sep 2022 20:00  Patient On (Oxygen Delivery Method): room air    PHYSICAL EXAM     GENERAL: lying in bed no acute distress   HEENT: PERRLA  LUNG: CTA   CARD: S1 S2   ABD: Soft   EXT: no c/c/e   Neuro: Alert Awake Ox 3                           12.7   6.04  )-----------( 208      ( 28 Sep 2022 05:37 )             38.9   09-    133<L>  |  104  |  21<H>  ----------------------------<  109<H>  4.9   |  21  |  1.3    Ca    9.5      27 Sep 2022 10:20    TPro  7.1  /  Alb  3.9  /  TBili  0.4  /  DBili  x   /  AST  16  /  ALT  8   /  AlkPhos  80      Urinalysis Basic - ( 27 Sep 2022 10:30 )    Color: Yellow / Appearance: Clear / S.020 / pH: x  Gluc: x / Ketone: Negative  / Bili: Negative / Urobili: 0.2 mg/dL   Blood: x / Protein: Trace mg/dL / Nitrite: Negative   Leuk Esterase: Small / RBC: >50 /HPF / WBC 26-50 /HPF   Sq Epi: x / Non Sq Epi: Few /HPF / Bacteria: Few          
89-year-old female with PMH of HTN who presents with urinary symptoms x2 weeks.  Patient was on Cipro x10 days no relief in symptoms then started amoxicillin for the past 3 days without any relief in symptoms.  Had a fever this morning TMAX 100.7 & came to ER.  Patient denies any other complaints.  Patient denies any abdominal pain, hematuria, nausea, vomiting, constipation, diarrhea, chest pain, shortness of breath, rash, recent travel, cough, sore throat    INTERVAL: seen in room this am feeling better     PAST MEDICAL & SURGICAL HISTORY:    HTN (hypertension)  UTI (urinary tract infection)  S/P cholecystectomy  H/O:   History of back surgery    ROS:     No fever no chills no sob     MEDICATIONS  (STANDING):  amLODIPine   Tablet 10 milliGRAM(s) Oral every 24 hours  cefTRIAXone   IVPB 1000 milliGRAM(s) IV Intermittent every 24 hours    MEDICATIONS  (PRN):  acetaminophen     Tablet .. 650 milliGRAM(s) Oral every 6 hours PRN Temp greater or equal to 38C (100.4F), Mild Pain (1 - 3)  ondansetron Injectable 4 milliGRAM(s) IV Push every 8 hours PRN Nausea and/or Vomiting    Vital Signs Last 24 Hrs  T(C): 36.3 (29 Sep 2022 05:03), Max: 36.7 (28 Sep 2022 13:49)  T(F): 97.3 (29 Sep 2022 05:03), Max: 98 (28 Sep 2022 13:49)  HR: 77 (29 Sep 2022 05:03) (77 - 77)  BP: 120/58 (29 Sep 2022 05:03) (110/57 - 125/58)  BP(mean): --  RR: 16 (29 Sep 2022 05:03) (16 - 18)  SpO2: --          PHYSICAL EXAM     GENERAL: lying in bed no acute distress   HEENT: PERRLA  LUNG: CTA   CARD: S1 S2   ABD: Soft   EXT: no c/c/e   Neuro: Alert Awake Ox 3     `                      12.7   6.04  )-----------( 208      ( 28 Sep 2022 05:37 )             38.9                           12.7   6.04  )-----------( 208      ( 28 Sep 2022 05:37 )             38.9   -    137  |  104  |  16  ----------------------------<  115<H>  4.4   |  20  |  1.1    Ca    9.8      28 Sep 2022 05:37    TPro  7.0  /  Alb  4.1  /  TBili  0.3  /  DBili  x   /  AST  13  /  ALT  8   /  AlkPhos  80      133<L>  |  104  |  21<H>  ----------------------------<  109<H>  4.9   |  21  |  1.3    Ca    9.5      27 Sep 2022 10:20    TPro  7.1  /  Alb  3.9  /  TBili  0.4  /  DBili  x   /  AST  16  /  ALT  8   /  AlkPhos  80      Urinalysis Basic - ( 27 Sep 2022 10:30 )    Color: Yellow / Appearance: Clear / S.020 / pH: x  Gluc: x / Ketone: Negative  / Bili: Negative / Urobili: 0.2 mg/dL   Blood: x / Protein: Trace mg/dL / Nitrite: Negative   Leuk Esterase: Small / RBC: >50 /HPF / WBC 26-50 /HPF   Sq Epi: x / Non Sq Epi: Few /HPF / Bacteria: Few        Culture - Urine (collected 27 Sep 2022 10:30)  Source: Clean Catch Clean Catch (Midstream)  Final Report (28 Sep 2022 21:09):    No growth

## 2022-09-29 NOTE — DISCHARGE NOTE NURSING/CASE MANAGEMENT/SOCIAL WORK - NSDCPEFALRISK_GEN_ALL_CORE
For information on Fall & Injury Prevention, visit: [email or web address] OR  [email or web address] OR  https://www.cdc.gov/steadi/patient.html

## 2022-09-29 NOTE — DISCHARGE NOTE PROVIDER - HOSPITAL COURSE
89-year-old female with PMH of HTN who presents with urinary symptoms x2 weeks.  Patient was on Cipro x10 days no relief in symptoms then started amoxicillin for the past 3 days without any relief in symptoms.  Had a fever this morning TMAX 100.7 & came to ER.  Patient denies any other complaints.  Patient denies any abdominal pain, hematuria, nausea, vomiting, constipation, diarrhea, chest pain, shortness of breath, rash, recent travel, cough, sore throat    INTERVAL: seen in room this am feeling better     Vital Signs Last 24 Hrs  T(C): 36.3 (29 Sep 2022 05:03), Max: 36.7 (28 Sep 2022 13:49)  T(F): 97.3 (29 Sep 2022 05:03), Max: 98 (28 Sep 2022 13:49)  HR: 77 (29 Sep 2022 05:03) (77 - 77)  BP: 120/58 (29 Sep 2022 05:03) (110/57 - 125/58)  BP(mean): --  RR: 16 (29 Sep 2022 05:03) (16 - 18)  SpO2: --          PHYSICAL EXAM     GENERAL: lying in bed no acute distress   HEENT: PERRLA  LUNG: CTA   CARD: S1 S2   ABD: Soft   EXT: no c/c/e     88 y/o female presents with fever and failed treatment of outpatient UTI       # UTI failed out patient treatment     - ceftriaxone   - follow up cultures   - switch to orals  - foi D/C     # HTN     - continue medications       ADVANCe CARE : DISCUSSED CPR     Disposition: home today

## 2022-10-03 DIAGNOSIS — N39.0 URINARY TRACT INFECTION, SITE NOT SPECIFIED: ICD-10-CM

## 2022-10-03 DIAGNOSIS — R39.9 UNSPECIFIED SYMPTOMS AND SIGNS INVOLVING THE GENITOURINARY SYSTEM: ICD-10-CM

## 2022-10-03 DIAGNOSIS — I10 ESSENTIAL (PRIMARY) HYPERTENSION: ICD-10-CM

## 2022-10-03 DIAGNOSIS — Z91.013 ALLERGY TO SEAFOOD: ICD-10-CM

## 2022-10-03 DIAGNOSIS — Z88.0 ALLERGY STATUS TO PENICILLIN: ICD-10-CM

## 2022-10-03 DIAGNOSIS — Z20.822 CONTACT WITH AND (SUSPECTED) EXPOSURE TO COVID-19: ICD-10-CM

## 2023-02-18 ENCOUNTER — APPOINTMENT (OUTPATIENT)
Dept: ORTHOPEDIC SURGERY | Facility: CLINIC | Age: 88
End: 2023-02-18
Payer: MEDICARE

## 2023-02-18 PROBLEM — Z00.00 ENCOUNTER FOR PREVENTIVE HEALTH EXAMINATION: Status: ACTIVE | Noted: 2023-02-18

## 2023-02-18 PROBLEM — N39.0 URINARY TRACT INFECTION, SITE NOT SPECIFIED: Chronic | Status: ACTIVE | Noted: 2022-09-27

## 2023-02-18 PROBLEM — I10 ESSENTIAL (PRIMARY) HYPERTENSION: Chronic | Status: ACTIVE | Noted: 2022-09-27

## 2023-02-18 PROCEDURE — 73562 X-RAY EXAM OF KNEE 3: CPT | Mod: RT

## 2023-02-18 PROCEDURE — 99203 OFFICE O/P NEW LOW 30 MIN: CPT | Mod: 25

## 2023-02-18 PROCEDURE — 20610 DRAIN/INJ JOINT/BURSA W/O US: CPT | Mod: RT

## 2023-02-18 NOTE — DATA REVIEWED
[FreeTextEntry1] : X-ray right knee: No acute fractures consultations, dislocations.  Moderate osteoarthritis throughout

## 2023-02-18 NOTE — HISTORY OF PRESENT ILLNESS
[de-identified] : Patient is a 89-year-old female here for evaluation of right knee pain.  Patient is accompanied by daughter.  Patient states on 2/10/2022 she woke up and noticed mild discomfort in the knee.  Patient states that she believes she twisted the right knee in bed.  Patient states since then her pain comes and goes depending on activity, no pain at rest.  Denies any fall, recent injury/trauma.  Denies numbness/tingling.  Uses cane for ambulation.

## 2023-02-18 NOTE — DISCUSSION/SUMMARY
[de-identified] : Discussed x-rays in detail with patient and daughter showing moderate osteoarthritis of right knee, no acute fractures, subluxations, dislocations.  Discussed treatment options detail including ice/heat, rest, anti-inflammatory medication, physical therapy, cortisone injection, gel injections.  Patient agreed to cortisone injection right knee.\par \par Dexamethasone and Lidocaine  \par \par Anesthesia: ethyl chloride sprayed topically. Dexamethasone 20mg/5mL 2 cc.  \par \par Lidocaine 1%: 2 cc.  \par \par   \par \par Medication was injected in the right knee after verbal consent using sterile preparation and technique. The risks, benefits, and alternatives to cortisone injection were explained in full to the patient. Risks outlined include but are not limited to infection, sepsis, bleeding, scarring, skin discoloration, temporary increase in pain, syncopal episode, failure to resolve symptoms, allergic reaction, symptom recurrence, and elevation of blood sugar in diabetics. Patient understood the risks. All questions were answered. After discussion of options, patient requested an injection. Oral informed consent was obtained and sterile prep was done of the injection site. Sterile technique was utilized for the procedure including the preparation of the solutions used for the injection. Patient tolerated the procedure well. Advised to ice the injection site this evening.\par \par Patient will call with any questions or concerns.  Call if symptoms worsen or change or do not improve.  Follow-up in 4 to 6 weeks for reevaluation.  Seen under supervision of Dr. Almanza. \par

## 2023-03-17 ENCOUNTER — APPOINTMENT (OUTPATIENT)
Dept: ORTHOPEDIC SURGERY | Facility: CLINIC | Age: 88
End: 2023-03-17

## 2023-04-07 ENCOUNTER — APPOINTMENT (OUTPATIENT)
Dept: ORTHOPEDIC SURGERY | Facility: CLINIC | Age: 88
End: 2023-04-07
Payer: MEDICARE

## 2023-04-07 DIAGNOSIS — M19.011 PRIMARY OSTEOARTHRITIS, RIGHT SHOULDER: ICD-10-CM

## 2023-04-07 DIAGNOSIS — M19.012 PRIMARY OSTEOARTHRITIS, RIGHT SHOULDER: ICD-10-CM

## 2023-04-07 PROCEDURE — 73030 X-RAY EXAM OF SHOULDER: CPT | Mod: 50

## 2023-04-07 PROCEDURE — 20610 DRAIN/INJ JOINT/BURSA W/O US: CPT | Mod: 50

## 2023-04-07 PROCEDURE — 99213 OFFICE O/P EST LOW 20 MIN: CPT | Mod: 25

## 2023-04-07 RX ORDER — HYLAN G-F 20 16MG/2ML
16 SYRINGE (ML) INTRAARTICULAR
Qty: 1 | Refills: 0 | Status: ACTIVE | OUTPATIENT
Start: 2023-04-07

## 2023-04-07 NOTE — HISTORY OF PRESENT ILLNESS
[de-identified] : Is a 89-year-old female here for reevaluation of right knee pain.  Patient was last seen February 18, 2023 and was given a cortisone injection to the right knee.  Patient states cortisone injection helped for short amount of time but still has pain.  Patient states pain especially worsens with activity.  Patient states that she is also been having bilateral shoulder pain for the past few months without any injury/trauma.  Patient states that she used to be seen in the past for shoulder pain and was given cortisone injection bilateral shoulders a few years ago.  Denies numbness/tingling, denies instability.

## 2023-04-07 NOTE — DATA REVIEWED
[FreeTextEntry1] : Bilateral shoulder x-rays: No obvious acute fractures, subluxations, dislocations.  Noted osteoarthritis bilateral shoulders with the worst arthritis of the left shoulder.  Noted possible chronic fracture left humeral head/neck healed.

## 2023-04-07 NOTE — DISCUSSION/SUMMARY
[de-identified] : Right knee: Discussed prior x-rays showing moderate osteoarthritis of the right knee.  Discussed treatment options detail including ice/heat, range of motion exercise, physical therapy, gel injection.  Patient agreed to gel injection.  Synvisc 3 injections ordered for right knee.  Patient will follow-up in 6 weeks.\par \par Bilateral shoulders: Discussed x-rays in detail showing osteoarthritis bilateral shoulders.  No obvious acute fractures, subluxations, dislocations.  Discussed treatment options detail including ice/heat, range of motion exercise, physical therapy, cortisone injection.  Patient agreed to cortisone injection bilateral shoulders.\par \par Dexamethasone and Lidocaine  \par \par Anesthesia: ethyl chloride sprayed topically. Dexamethasone 10mg/1mL 1 cc.  \par \par Lidocaine 1%: 2 cc.  \par \par   \par \par Medication was injected in bilateral shoulder after verbal consent using sterile preparation and technique. The risks, benefits, and alternatives to cortisone injection were explained in full to the patient. Risks outlined include but are not limited to infection, sepsis, bleeding, scarring, skin discoloration, temporary increase in pain, syncopal episode, failure to resolve symptoms, allergic reaction, symptom recurrence, and elevation of blood sugar in diabetics. Patient understood the risks. All questions were answered. After discussion of options, patient requested an injection. Oral informed consent was obtained and sterile prep was done of the injection site. Sterile technique was utilized for the procedure including the preparation of the solutions used for the injection. Patient tolerated the procedure well. Advised to ice the injection site this evening. \par \par Patient will follow-up bilateral shoulders as needed.  Call office if symptoms worsen or change.  Patient understands agrees to plan.  Seen on supervision of Dr. Almanza.

## 2023-04-24 ENCOUNTER — APPOINTMENT (OUTPATIENT)
Dept: NEUROSURGERY | Facility: CLINIC | Age: 88
End: 2023-04-24
Payer: MEDICARE

## 2023-04-24 DIAGNOSIS — Z98.890 OTHER SPECIFIED POSTPROCEDURAL STATES: ICD-10-CM

## 2023-04-24 PROCEDURE — 99204 OFFICE O/P NEW MOD 45 MIN: CPT

## 2023-04-24 NOTE — DISCUSSION/SUMMARY
[de-identified] : Ms. Alcazar presents today as a 89 year old woman for neurosurgical consult. She had a previous L3-4 Lumbar Laminectomy with coflex completed by Dr. Godoy in the past, 09/2017 for likely left microdiscectomy at L3-4. She then went for reexploration of the same levels L3-4 laminectomy with coflex 02/2018. Surgery did improve her lower back pain. Unfortunately within the last year or so she began to have right lower back pain into the right knee and right anterolateral thigh. We need an MRI of the lumbar spine with and without contrast as well as an X-ray Lumbar flex/ext. I will see her back once completed. \par \par I, Tan Quiñones, attest that this documentation has been prepared under the direction and in the presence of Provider Jostin Panda MD\par  \par Thank you for allowing me to assist in the care of this patient. \par  \par Jostin Panda MD\par \par Board Certified , Neurosurgeon\par \par

## 2023-04-24 NOTE — HISTORY OF PRESENT ILLNESS
[de-identified] : Ms. Alcazar presents today as a 89 year old woman for neurosurgical consult. She had a previous L3-4 Lumbar Laminectomy with coflex completed by Dr. Godoy in the past, 09/2017 for likely left microdiscectomy at L3-4. She then went for reexploration of the same levels L3-4 laminectomy with coflex 02/2018. Surgery did improve her lower back pain. Unfortunately within the last year or so she began to have right lower back pain into the right knee and right anterolateral thigh. Especially when walking or standing, decreased with sitting and laying down. She denies paresthesia at this time. No imaging for review at this time. She has not yet undergone injection therapy or trialed recent physical therapy. No injury or incident noted.

## 2023-04-24 NOTE — PHYSICAL EXAM
[de-identified] : Lower back pain increased on flex/ext\par DTR intact \par Positive gilmer sign on the right

## 2023-05-19 ENCOUNTER — APPOINTMENT (OUTPATIENT)
Dept: ORTHOPEDIC SURGERY | Facility: CLINIC | Age: 88
End: 2023-05-19

## 2023-05-25 ENCOUNTER — APPOINTMENT (OUTPATIENT)
Dept: RADIOLOGY | Facility: CLINIC | Age: 88
End: 2023-05-25
Payer: MEDICARE

## 2023-05-25 ENCOUNTER — APPOINTMENT (OUTPATIENT)
Dept: ORTHOPEDIC SURGERY | Facility: CLINIC | Age: 88
End: 2023-05-25
Payer: MEDICARE

## 2023-05-25 DIAGNOSIS — M17.11 UNILATERAL PRIMARY OSTEOARTHRITIS, RIGHT KNEE: ICD-10-CM

## 2023-05-25 PROCEDURE — 20610 DRAIN/INJ JOINT/BURSA W/O US: CPT | Mod: RT

## 2023-05-25 PROCEDURE — 99213 OFFICE O/P EST LOW 20 MIN: CPT | Mod: 25

## 2023-05-25 PROCEDURE — 72110 X-RAY EXAM L-2 SPINE 4/>VWS: CPT

## 2023-05-25 NOTE — HISTORY OF PRESENT ILLNESS
[de-identified] : 89-year-old female comes in today with her daughter for her first Synvisc injection for her right knee.  Injections were ordered by neck.  The cortisone injection did not give her much relief.  Not interested in pursuing surgery

## 2023-05-25 NOTE — IMAGING
[de-identified] : On examination of the right knee mild swelling, no ecchymosis, no edema.  Skin is intact.

## 2023-05-25 NOTE — ASSESSMENT
[FreeTextEntry1] : Today in the office instructions I injected the right knee lateral approach with Synvisc.  Patient tolerated the procedure well.  Puncture site was covered Band-Aid.  Icing if she has any soreness.  She will follow-up next week for her second right knee Synvisc injection.

## 2023-05-30 ENCOUNTER — APPOINTMENT (OUTPATIENT)
Dept: NEUROSURGERY | Facility: CLINIC | Age: 88
End: 2023-05-30
Payer: MEDICARE

## 2023-05-30 DIAGNOSIS — M54.50 LOW BACK PAIN, UNSPECIFIED: ICD-10-CM

## 2023-05-30 PROCEDURE — 99214 OFFICE O/P EST MOD 30 MIN: CPT

## 2023-05-30 NOTE — DISCUSSION/SUMMARY
[de-identified] : Ms. Alcazar presents today as a 89 year old woman for neurosurgical follow-up. She had a previous L3-4 Lumbar Laminectomy with coflex completed by Dr. Godoy in the past, 09/2017 for likely microdiscectomy at L3-4. She then went for reexploration of the same levels right L3-4 laminectomy with coflex 02/2018. Surgery did improve her lower back pain. Unfortunately within the last year or so she began to have right lower back pain into the right knee and right anterolateral thigh. Only bothersome with some walking or standing. Overall intermittent in nature. She describes pain as aches not sharp shooting pain. Overall pain may be related to lack of activity and lack of exercise. She may have clinical findings of right hip and bilateral SI joint pain, but though she does not point to this as her source of pain we will leave these alone at this time. She will follow-back as needed. \par \par TOÑO, Tan Quiñones, attest that this documentation has been prepared under the direction and in the presence of Provider Jostin Panda MD\par  \par Thank you for allowing me to assist in the care of this patient. \par  \par Jostin Panda MD\par \par Board Certified , Neurosurgeon\par \par \par

## 2023-05-30 NOTE — ASU PREOP CHECKLIST - LOOSE TEETH
[FreeTextEntry1] : Diagnosis: \par PSA screening \par Dysuria \par \par \par Plan \par PSA\par UCX\par Urine mycoplasma/ureaplasma 
no

## 2023-05-30 NOTE — HISTORY OF PRESENT ILLNESS
[de-identified] : Ms. Alcazar presents today as a 89 year old woman for neurosurgical follow-up. She had a previous L3-4 Lumbar Laminectomy with coflex completed by Dr. Godoy in the past, 09/2017 for likely microdiscectomy at L3-4. She then went for reexploration of the same levels right L3-4 laminectomy with coflex 02/2018. Surgery did improve her lower back pain. Unfortunately within the last year or so she began to have right lower back pain into the right knee and right anterolateral thigh. Especially when walking or standing, decreased with sitting and laying down. She feels as if this is an aching pain and comes and goes. Only bothersome with some walking or standing. Overall intermittent in nature. MRI of the lumbar spine was denied at this time. She has not yet undergone injection therapy or trialed recent physical therapy and does not want to. No injury or incident noted.  She describes pain as aches not sharp shooting pain. \par \par X-ray of the lumbar 05/25/23: Osteoporosis. Severely degenerated L3-4 intervertebral disc, prior posterior decompression and coflex device interlaminar fusion. Multilevel spondylosis with grade 1 anterior degenerative listhesis of L4 relative to L5. Calcified abdominal aorta and 18mm spherical calcification at L2-3 prevertebral space.

## 2023-05-30 NOTE — PHYSICAL EXAM
[de-identified] : Tenderness to palpation of the right hip\par Positive Latasha sign, bilaterally but patient notes these are not her areas of pain

## 2023-06-02 ENCOUNTER — APPOINTMENT (OUTPATIENT)
Dept: ORTHOPEDIC SURGERY | Facility: CLINIC | Age: 88
End: 2023-06-02

## 2023-06-08 ENCOUNTER — APPOINTMENT (OUTPATIENT)
Dept: ORTHOPEDIC SURGERY | Facility: CLINIC | Age: 88
End: 2023-06-08

## 2023-07-11 NOTE — PATIENT PROFILE ADULT. - SUPPORT PERSON PHONE
PT TREATMENT     07/11/23 1420   Note Type   Note Type Treatment   Type of Brace   Brace Applied Gibsonia Kunia TLSO   End of Consult   Patient Position at End of Consult Bedside chair;Bed/Chair alarm activated  (reclined)   Pain Assessment   Pain Assessment Tool 0-10   Pain Score 8   Pain Location/Orientation Location: Back  (RN aware)   Restrictions/Precautions   Braces or Orthoses TLSO   Other Precautions Chair Alarm; Bed Alarm;Pain; Fall Risk   General   Chart Reviewed Yes   Cognition   Overall Cognitive Status WFL   Arousal/Participation Cooperative   Subjective   Subjective "I want to try to sit in the chair"   Bed Mobility   Supine to Sit 3  Moderate assistance   Transfers   Sit to Stand 3  Moderate assistance   Stand to Sit 3  Moderate assistance   Stand pivot 3  Moderate assistance   Additional items Increased time required;Verbal cues; Impulsive  (with walker)   Balance   Static Sitting Fair -   Static Standing Fair -   Activity Tolerance   Activity Tolerance Patient limited by pain; Patient limited by fatigue   Assessment   Prognosis Good   Problem List Decreased strength;Decreased endurance; Impaired balance;Decreased mobility;Pain;Orthopedic restrictions   Assessment Pt seen after multiple attempts today. Pt was able to don the TLSO with assist and transfer to a chair with mod assist with a walker however c/o 8/10 pain. Pt unable to walk due to pain. RN aware and to contact MD in regards to additional pain meds. The patient's AM-PAC Basic Mobility Inpatient Short Form Raw Score is 11. A Raw score of less than or equal to 16 suggests the patient may benefit from discharge to post-acute rehabilitation services however pt's family plans to take pt home. Hopeful with improved pain control and continued PT pt will be theresa to go home with home PT and family assist.     Plan   Treatment/Interventions LE strengthening/ROM; Functional transfer training;Continued evaluation;Gait training;Bed mobility; Equipment eval/education;Patient/family training; Therapeutic exercise;ADL retraining   Progress Slow progress, decreased activity tolerance   PT Frequency Other (Comment)  (5x/week)   Recommendation   PT Discharge Recommendation Home with home health rehabilitation  (24 hour assist)   AM-PAC Basic Mobility Inpatient   Turning in Flat Bed Without Bedrails 3   Lying on Back to Sitting on Edge of Flat Bed Without Bedrails 2   Moving Bed to Chair 2   Standing Up From Chair Using Arms 2   Walk in Room 1   Climb 3-5 Stairs With Railing 1   Basic Mobility Inpatient Raw Score 11   Basic Mobility Standardized Score 30.25   Highest Level Of Mobility   -HLM Goal 4: Move to chair/commode   JH-HLM Achieved 4: Move to chair/commode   End of Consult   Patient Position at End of Consult Bedside chair; All needs within reach;Bed/Chair alarm activated  (daughter at bedside)   [id number] Number  [facility] [id number] [phone]

## 2023-12-29 NOTE — ASU PATIENT PROFILE, ADULT - BRADEN SCORE (IF 18 OR LESS ACTIVATE SKIN INJURY RISK INCREASED GUIDELINE), MLM
Action Requested: Summary for Provider     []  Critical Lab, Recommendations Needed  [] Need Additional Advice  []   FYI    []   Need Orders  [] Need Medications Sent to Pharmacy  []  Other     SUMMARY: Patient advised immediate care and verbalized she will go now.    Patient states she does not have a uterus and is having vaginal bleeding today, started today whenever she wipes herself , blood on toilet paper. Patient denies dizziness,fever,back pain, urinary urgency or burning with urination. Patient states she can not identify where the blood is coming from. Not sure if it is from the vagina or urethra.     Patient also states she has little bruises on her arms and red spots. These have been there for over 1 week. She does not take any blood thinners, no known injury. Patient already has appointment tomorrow to evaluate the spots on her arms.            Reason for call: Vaginal Bleeding  Onset: today  Reason for Disposition   Patient wants to be seen    Protocols used: Vaginal Bleeding - [id number]     ID # [id number]   
20

## 2024-10-25 ENCOUNTER — APPOINTMENT (OUTPATIENT)
Facility: CLINIC | Age: 89
End: 2024-10-25
Payer: MEDICARE

## 2024-10-25 ENCOUNTER — NON-APPOINTMENT (OUTPATIENT)
Age: 89
End: 2024-10-25

## 2024-10-25 PROCEDURE — 99204 OFFICE O/P NEW MOD 45 MIN: CPT

## 2024-10-25 PROCEDURE — 92202 OPSCPY EXTND ON/MAC DRAW: CPT

## 2024-10-29 ENCOUNTER — OUTPATIENT (OUTPATIENT)
Dept: OUTPATIENT SERVICES | Facility: HOSPITAL | Age: 89
LOS: 1 days | End: 2024-10-29
Payer: MEDICARE

## 2024-10-29 ENCOUNTER — APPOINTMENT (OUTPATIENT)
Dept: OPHTHALMOLOGY | Facility: CLINIC | Age: 89
End: 2024-10-29
Payer: MEDICARE

## 2024-10-29 DIAGNOSIS — Z98.890 OTHER SPECIFIED POSTPROCEDURAL STATES: Chronic | ICD-10-CM

## 2024-10-29 DIAGNOSIS — Z90.49 ACQUIRED ABSENCE OF OTHER SPECIFIED PARTS OF DIGESTIVE TRACT: Chronic | ICD-10-CM

## 2024-10-29 DIAGNOSIS — H25.89 OTHER AGE-RELATED CATARACT: ICD-10-CM

## 2024-10-29 DIAGNOSIS — Z98.891 HISTORY OF UTERINE SCAR FROM PREVIOUS SURGERY: Chronic | ICD-10-CM

## 2024-10-29 DIAGNOSIS — H25.13 AGE-RELATED NUCLEAR CATARACT, BILATERAL: ICD-10-CM

## 2024-10-29 PROCEDURE — 92136 OPHTHALMIC BIOMETRY: CPT | Mod: 26

## 2024-10-29 PROCEDURE — 92136 OPHTHALMIC BIOMETRY: CPT

## 2024-12-02 ENCOUNTER — TRANSCRIPTION ENCOUNTER (OUTPATIENT)
Age: 88
End: 2024-12-02

## 2024-12-02 ENCOUNTER — APPOINTMENT (OUTPATIENT)
Dept: OPHTHALMOLOGY | Facility: HOSPITAL | Age: 88
End: 2024-12-02

## 2024-12-02 ENCOUNTER — OUTPATIENT (OUTPATIENT)
Dept: OUTPATIENT SERVICES | Facility: HOSPITAL | Age: 88
LOS: 1 days | Discharge: HOME | End: 2024-12-02
Payer: MEDICARE

## 2024-12-02 VITALS — RESPIRATION RATE: 18 BRPM | SYSTOLIC BLOOD PRESSURE: 119 MMHG | HEART RATE: 78 BPM | DIASTOLIC BLOOD PRESSURE: 76 MMHG

## 2024-12-02 VITALS
DIASTOLIC BLOOD PRESSURE: 80 MMHG | WEIGHT: 184.97 LBS | TEMPERATURE: 98 F | RESPIRATION RATE: 17 BRPM | SYSTOLIC BLOOD PRESSURE: 135 MMHG | HEIGHT: 61 IN | OXYGEN SATURATION: 98 % | HEART RATE: 77 BPM

## 2024-12-02 DIAGNOSIS — Z98.891 HISTORY OF UTERINE SCAR FROM PREVIOUS SURGERY: Chronic | ICD-10-CM

## 2024-12-02 DIAGNOSIS — Z98.890 OTHER SPECIFIED POSTPROCEDURAL STATES: Chronic | ICD-10-CM

## 2024-12-02 DIAGNOSIS — Z90.49 ACQUIRED ABSENCE OF OTHER SPECIFIED PARTS OF DIGESTIVE TRACT: Chronic | ICD-10-CM

## 2024-12-02 DIAGNOSIS — H25.22 AGE-RELATED CATARACT, MORGAGNIAN TYPE, LEFT EYE: ICD-10-CM

## 2024-12-02 PROCEDURE — V2632: CPT

## 2024-12-02 PROCEDURE — 66984 XCAPSL CTRC RMVL W/O ECP: CPT | Mod: LT

## 2024-12-02 RX ORDER — AMLODIPINE BESYLATE 10 MG/1
0 TABLET ORAL
Refills: 0 | DISCHARGE

## 2024-12-02 RX ORDER — METOPROLOL TARTRATE 100 MG/1
0 TABLET, FILM COATED ORAL
Refills: 0 | DISCHARGE

## 2024-12-02 RX ORDER — ESOMEPRAZOLE MAGNESIUM 20 MG/1
0 CAPSULE, DELAYED RELEASE ORAL
Refills: 0 | DISCHARGE

## 2024-12-02 NOTE — ASU PATIENT PROFILE, ADULT - TEACHING/LEARNING EDUCATIONAL LEVEL
How Severe Is Your Skin Lesion?: mild Has Your Skin Lesion Been Treated?: not been treated Is This A New Presentation, Or A Follow-Up?: Skin Lesion career/technical training

## 2024-12-02 NOTE — ASU PATIENT PROFILE, ADULT - FALL HARM RISK - RISK INTERVENTIONS

## 2024-12-02 NOTE — ASU DISCHARGE PLAN (ADULT/PEDIATRIC) - FINANCIAL ASSISTANCE
[facility] provides services at a reduced cost to those who are determined to be eligible through [facility]’s financial assistance program. Information regarding [facility]’s financial assistance program can be found by going to [email or web address] or by calling [phone].

## 2024-12-02 NOTE — ASU PATIENT PROFILE, ADULT - NSICDXPASTMEDICALHX_GEN_ALL_CORE_FT
PAST MEDICAL HISTORY:  GERD (gastroesophageal reflux disease)     HTN (hypertension)     Sciatica     UTI (urinary tract infection)

## 2024-12-03 ENCOUNTER — APPOINTMENT (OUTPATIENT)
Facility: CLINIC | Age: 88
End: 2024-12-03
Payer: MEDICARE

## 2024-12-03 ENCOUNTER — NON-APPOINTMENT (OUTPATIENT)
Age: 88
End: 2024-12-03

## 2024-12-03 PROBLEM — M54.30 SCIATICA, UNSPECIFIED SIDE: Chronic | Status: ACTIVE | Noted: 2024-12-02

## 2024-12-03 PROBLEM — K21.9 GASTRO-ESOPHAGEAL REFLUX DISEASE WITHOUT ESOPHAGITIS: Chronic | Status: ACTIVE | Noted: 2024-12-02

## 2024-12-03 PROCEDURE — 99024 POSTOP FOLLOW-UP VISIT: CPT

## 2024-12-05 DIAGNOSIS — H25.12 AGE-RELATED NUCLEAR CATARACT, LEFT EYE: ICD-10-CM

## 2024-12-05 DIAGNOSIS — K21.9 GASTRO-ESOPHAGEAL REFLUX DISEASE WITHOUT ESOPHAGITIS: ICD-10-CM

## 2024-12-05 DIAGNOSIS — I10 ESSENTIAL (PRIMARY) HYPERTENSION: ICD-10-CM

## 2024-12-05 DIAGNOSIS — Z88.0 ALLERGY STATUS TO PENICILLIN: ICD-10-CM

## 2025-01-06 NOTE — DISCHARGE NOTE PROVIDER - DATE OF DISCHARGE SERVICE:
January 6, 2025        Josefina Sorto  [address] [facility] [address]        Dear Josefina Sorto:    I have made an attempt to contact you at [phone]   , in regard to scheduling a follow up and labs with hepatology.    Please contact the clinic at [phone] at your earliest convenience. Failure to do so will limit our ability to give you the best comprehensive care. We look forward to hearing from you.      Sincerely,      Hung  Clinical   [facility]       [facility]     
29-Sep-2022

## 2025-01-24 ENCOUNTER — APPOINTMENT (OUTPATIENT)
Facility: CLINIC | Age: 89
End: 2025-01-24

## 2025-02-11 NOTE — ANESTHESIA FOLLOW-UP NOTE - NSEVALATIONFT_GEN_ALL_CORE
What Type Of Note Output Would You Prefer (Optional)?: Bullet Format
How Severe Is Your Skin Lesion?: moderate
Has Your Skin Lesion Been Treated?: not been treated
Is This A New Presentation, Or A Follow-Up?: Skin Lesion
Pt awake, stable

## 2025-04-29 NOTE — PATIENT PROFILE ADULT. - TEACHING/LEARNING DEVELOPMENTAL CONSIDERATIONS
SUBJECTIVE  Clau Nunn is 89 y.o. female  Corrected distance visual acuity was 20/25 in the right eye and 20/25 -2 in the left eye.   Chief Complaint   Patient presents with    Annual Exam     Pt reports for annual exam. Denies any pain or irritation. Va stable. Using AT/restasis prn.           HPI     Annual Exam     Additional comments: Pt reports for annual exam. Denies any pain or   irritation. Va stable. Using AT/restasis prn.            Comments    1. Restor IOL OU-Delvin   2. Dry eyes-CSM   3. Dizzness-No Ocular involvement   4. RE- RX for SV glasses.   5. Blepharitis     Restasis bid prn   AT's PRN OU   Emycin krista as needed             Last edited by Zafar Panda on 4/29/2025  1:39 PM.         Assessment Plan :  1. Pseudophakia of both eyes  -- Condition stable, no therapeutic change required. Monitoring routinely.     2. Dry eye  -- Condition stable, no therapeutic change required. Monitoring routinely.     3. Refractive error - PAL Rx     RTC in 1 year with Dr. Kinsey or prn any changes              
none

## 2025-06-13 ENCOUNTER — APPOINTMENT (OUTPATIENT)
Dept: PAIN MANAGEMENT | Facility: CLINIC | Age: 89
End: 2025-06-13
Payer: MEDICARE

## 2025-06-13 VITALS — BODY MASS INDEX: 33.99 KG/M2 | HEIGHT: 61 IN | WEIGHT: 180 LBS

## 2025-06-13 PROCEDURE — 20610 DRAIN/INJ JOINT/BURSA W/O US: CPT | Mod: RT

## 2025-06-13 PROCEDURE — 99204 OFFICE O/P NEW MOD 45 MIN: CPT | Mod: 25

## 2025-06-13 PROCEDURE — 99214 OFFICE O/P EST MOD 30 MIN: CPT | Mod: 25
